# Patient Record
Sex: MALE | Race: WHITE | NOT HISPANIC OR LATINO | Employment: OTHER | URBAN - METROPOLITAN AREA
[De-identification: names, ages, dates, MRNs, and addresses within clinical notes are randomized per-mention and may not be internally consistent; named-entity substitution may affect disease eponyms.]

---

## 2019-03-25 ENCOUNTER — HOSPITAL ENCOUNTER (OUTPATIENT)
Dept: OTHER | Facility: HOSPITAL | Age: 79
Discharge: HOME | End: 2019-03-25

## 2019-03-25 LAB
ALBUMIN SERPL-MCNC: 3.7 G/DL (ref 3.5–5)
ALBUMIN/GLOB SERPL: 1.2 {RATIO} (ref 1.4–2.6)
ALP SERPL-CCNC: 66 U/L (ref 56–155)
ALT SERPL-CCNC: 13 U/L (ref 10–40)
ANION GAP SERPL CALC-SCNC: 18 MMOL/L (ref 8–19)
AST SERPL-CCNC: 15 U/L (ref 15–50)
BASOPHILS # BLD AUTO: 0.08 10*3/UL (ref 0–0.2)
BASOPHILS NFR BLD AUTO: 1.1 % (ref 0–3)
BILIRUB SERPL-MCNC: 0.4 MG/DL (ref 0.2–1.3)
BNP SERPL-MCNC: 194 PG/ML (ref 0–1800)
BUN SERPL-MCNC: 26 MG/DL (ref 5–25)
BUN/CREAT SERPL: 17 {RATIO} (ref 6–20)
CALCIUM SERPL-MCNC: 9.4 MG/DL (ref 8.7–10.4)
CHLORIDE SERPL-SCNC: 102 MMOL/L (ref 99–111)
CHOLEST SERPL-MCNC: 116 MG/DL (ref 107–200)
CHOLEST/HDLC SERPL: 4.3 {RATIO} (ref 3–6)
CONV ABS IMM GRAN: 0.07 10*3/UL (ref 0–0.2)
CONV CO2: 22 MMOL/L (ref 22–32)
CONV IMMATURE GRAN: 1 % (ref 0–1.8)
CONV TOTAL PROTEIN: 6.8 G/DL (ref 6.3–8.2)
CREAT UR-MCNC: 1.53 MG/DL (ref 0.7–1.2)
DEPRECATED RDW RBC AUTO: 42.6 FL (ref 35.1–43.9)
EOSINOPHIL # BLD AUTO: 0.23 10*3/UL (ref 0–0.7)
EOSINOPHIL # BLD AUTO: 3.1 % (ref 0–7)
ERYTHROCYTE [DISTWIDTH] IN BLOOD BY AUTOMATED COUNT: 13 % (ref 11.6–14.4)
GFR SERPLBLD BASED ON 1.73 SQ M-ARVRAT: 42 ML/MIN/{1.73_M2}
GLOBULIN UR ELPH-MCNC: 3.1 G/DL (ref 2–3.5)
GLUCOSE SERPL-MCNC: 125 MG/DL (ref 70–99)
HBA1C MFR BLD: 15.8 G/DL (ref 14–18)
HCT VFR BLD AUTO: 48.3 % (ref 42–52)
HDLC SERPL-MCNC: 27 MG/DL (ref 40–60)
LDLC SERPL CALC-MCNC: 65 MG/DL (ref 70–100)
LYMPHOCYTES # BLD AUTO: 2.31 10*3/UL (ref 1–5)
MCH RBC QN AUTO: 29.6 PG (ref 27–31)
MCHC RBC AUTO-ENTMCNC: 32.7 G/DL (ref 33–37)
MCV RBC AUTO: 90.4 FL (ref 80–96)
MONOCYTES # BLD AUTO: 0.75 10*3/UL (ref 0.2–1.2)
MONOCYTES NFR BLD AUTO: 10.2 % (ref 3–10)
NEUTROPHILS # BLD AUTO: 3.9 10*3/UL (ref 2–8)
NEUTROPHILS NFR BLD AUTO: 53.1 % (ref 30–85)
NRBC CBCN: 0 % (ref 0–0.7)
OSMOLALITY SERPL CALC.SUM OF ELEC: 292 MOSM/KG (ref 273–304)
PLATELET # BLD AUTO: 242 10*3/UL (ref 130–400)
PMV BLD AUTO: 10.5 FL (ref 9.4–12.4)
POTASSIUM SERPL-SCNC: 4.4 MMOL/L (ref 3.5–5.3)
RBC # BLD AUTO: 5.34 10*6/UL (ref 4.7–6.1)
SODIUM SERPL-SCNC: 138 MMOL/L (ref 135–147)
TRIGL SERPL-MCNC: 119 MG/DL (ref 40–150)
VARIANT LYMPHS NFR BLD MANUAL: 31.5 % (ref 20–45)
VLDLC SERPL-MCNC: 24 MG/DL (ref 5–37)
WBC # BLD AUTO: 7.34 10*3/UL (ref 4.8–10.8)

## 2019-05-12 ENCOUNTER — HOSPITAL ENCOUNTER (OUTPATIENT)
Dept: OTHER | Facility: HOSPITAL | Age: 79
Discharge: HOME | End: 2019-05-12

## 2019-05-18 ENCOUNTER — HOSPITAL ENCOUNTER (OUTPATIENT)
Dept: OTHER | Facility: HOSPITAL | Age: 79
Discharge: HOME | End: 2019-05-18

## 2019-05-18 LAB
25(OH)D3 SERPL-MCNC: 34.6 NG/ML (ref 30–100)
ALBUMIN SERPL-MCNC: 3.7 G/DL (ref 3.5–5)
ALBUMIN/GLOB SERPL: 1.5 {RATIO} (ref 1.4–2.6)
ALP SERPL-CCNC: 70 U/L (ref 56–155)
ALT SERPL-CCNC: 28 U/L (ref 10–40)
ANION GAP SERPL CALC-SCNC: 17 MMOL/L (ref 8–19)
AST SERPL-CCNC: 22 U/L (ref 15–50)
BILIRUB SERPL-MCNC: 0.41 MG/DL (ref 0.2–1.3)
BNP SERPL-MCNC: 226 PG/ML (ref 0–1800)
BUN SERPL-MCNC: 24 MG/DL (ref 5–25)
BUN/CREAT SERPL: 16 {RATIO} (ref 6–20)
CALCIUM SERPL-MCNC: 8.7 MG/DL (ref 8.7–10.4)
CHLORIDE SERPL-SCNC: 108 MMOL/L (ref 99–111)
CONV CO2: 22 MMOL/L (ref 22–32)
CONV TOTAL PROTEIN: 6.2 G/DL (ref 6.3–8.2)
CREAT UR-MCNC: 1.53 MG/DL (ref 0.7–1.2)
GFR SERPLBLD BASED ON 1.73 SQ M-ARVRAT: 42 ML/MIN/{1.73_M2}
GLOBULIN UR ELPH-MCNC: 2.5 G/DL (ref 2–3.5)
GLUCOSE SERPL-MCNC: 113 MG/DL (ref 70–99)
OSMOLALITY SERPL CALC.SUM OF ELEC: 301 MOSM/KG (ref 273–304)
POTASSIUM SERPL-SCNC: 4.3 MMOL/L (ref 3.5–5.3)
PSA SERPL-MCNC: 3.67 NG/ML (ref 0–4)
SODIUM SERPL-SCNC: 143 MMOL/L (ref 135–147)
URATE SERPL-MCNC: 7.9 MG/DL (ref 3.5–8.5)
VIT B12 SERPL-MCNC: 387 PG/ML (ref 211–911)

## 2020-01-01 ENCOUNTER — OFFICE VISIT CONVERTED (OUTPATIENT)
Dept: ORTHOPEDIC SURGERY | Facility: CLINIC | Age: 80
End: 2020-01-01
Attending: PHYSICIAN ASSISTANT

## 2020-01-01 ENCOUNTER — HOSPITAL ENCOUNTER (OUTPATIENT)
Dept: SURGERY | Facility: CLINIC | Age: 80
Discharge: HOME | End: 2020-08-13
Attending: NURSE PRACTITIONER

## 2020-01-01 ENCOUNTER — CONVERSION ENCOUNTER (OUTPATIENT)
Dept: SURGERY | Facility: CLINIC | Age: 80
End: 2020-01-01

## 2020-01-01 ENCOUNTER — OFFICE VISIT CONVERTED (OUTPATIENT)
Dept: UROLOGY | Facility: CLINIC | Age: 80
End: 2020-01-01
Attending: UROLOGY

## 2020-01-01 ENCOUNTER — OFFICE VISIT CONVERTED (OUTPATIENT)
Dept: UROLOGY | Facility: CLINIC | Age: 80
End: 2020-01-01
Attending: NURSE PRACTITIONER

## 2020-01-01 ENCOUNTER — CONVERSION ENCOUNTER (OUTPATIENT)
Dept: ORTHOPEDIC SURGERY | Facility: CLINIC | Age: 80
End: 2020-01-01

## 2020-01-01 LAB
AMPICILLIN SUSC ISLT: >=32
AMPICILLIN+SULBAC SUSC ISLT: >=32
BACTERIA UR CULT: ABNORMAL
CEFAZOLIN SUSC ISLT: >=64
CEFEPIME SUSC ISLT: 16
CEFTAZIDIME SUSC ISLT: >=64
CEFTRIAXONE SUSC ISLT: >=64
CIPROFLOXACIN SUSC ISLT: >=4
GENTAMICIN SUSC ISLT: >=16
LEVOFLOXACIN SUSC ISLT: >=8
NITROFURANTOIN SUSC ISLT: <=16
PIP+TAZO SUSC ISLT: 64
TMP SMX SUSC ISLT: <=20
TOBRAMYCIN SUSC ISLT: 8

## 2020-10-31 ENCOUNTER — LAB REQUISITION (OUTPATIENT)
Dept: LAB | Facility: HOSPITAL | Age: 80
End: 2020-10-31

## 2020-10-31 DIAGNOSIS — Z00.00 ROUTINE GENERAL MEDICAL EXAMINATION AT A HEALTH CARE FACILITY: ICD-10-CM

## 2020-10-31 LAB — CARBAMAZEPINE SERPL-MCNC: 3.8 MCG/ML (ref 4–12)

## 2020-10-31 PROCEDURE — 80156 ASSAY CARBAMAZEPINE TOTAL: CPT

## 2021-01-01 ENCOUNTER — APPOINTMENT (OUTPATIENT)
Dept: GENERAL RADIOLOGY | Facility: HOSPITAL | Age: 81
End: 2021-01-01

## 2021-01-01 ENCOUNTER — APPOINTMENT (OUTPATIENT)
Dept: CT IMAGING | Facility: HOSPITAL | Age: 81
End: 2021-01-01

## 2021-01-01 ENCOUNTER — HOSPITAL ENCOUNTER (INPATIENT)
Facility: HOSPITAL | Age: 81
LOS: 6 days | End: 2021-02-15
Attending: EMERGENCY MEDICINE | Admitting: INTERNAL MEDICINE

## 2021-01-01 VITALS
HEIGHT: 72 IN | SYSTOLIC BLOOD PRESSURE: 80 MMHG | TEMPERATURE: 102.6 F | DIASTOLIC BLOOD PRESSURE: 52 MMHG | BODY MASS INDEX: 22.62 KG/M2 | OXYGEN SATURATION: 88 % | WEIGHT: 167 LBS

## 2021-01-01 DIAGNOSIS — E86.0 DEHYDRATION: ICD-10-CM

## 2021-01-01 DIAGNOSIS — J18.9 PNEUMONIA OF RIGHT MIDDLE LOBE DUE TO INFECTIOUS ORGANISM: Primary | ICD-10-CM

## 2021-01-01 DIAGNOSIS — R93.89 ABNORMAL CHEST X-RAY: ICD-10-CM

## 2021-01-01 DIAGNOSIS — R62.7 FAILURE TO THRIVE IN ADULT: ICD-10-CM

## 2021-01-01 LAB
ALBUMIN SERPL-MCNC: 2.8 G/DL (ref 3.5–5.2)
ALBUMIN SERPL-MCNC: 3.2 G/DL (ref 3.5–5.2)
ALBUMIN SERPL-MCNC: 3.4 G/DL (ref 3.5–5.2)
ALBUMIN/GLOB SERPL: 1 G/DL
ALBUMIN/GLOB SERPL: 1.1 G/DL
ALBUMIN/GLOB SERPL: 1.2 G/DL
ALP SERPL-CCNC: 127 U/L (ref 39–117)
ALP SERPL-CCNC: 129 U/L (ref 39–117)
ALP SERPL-CCNC: 135 U/L (ref 39–117)
ALT SERPL W P-5'-P-CCNC: 150 U/L (ref 1–41)
ALT SERPL W P-5'-P-CCNC: 215 U/L (ref 1–41)
ALT SERPL W P-5'-P-CCNC: 373 U/L (ref 1–41)
ANION GAP SERPL CALCULATED.3IONS-SCNC: 10 MMOL/L (ref 5–15)
ANION GAP SERPL CALCULATED.3IONS-SCNC: 13.8 MMOL/L (ref 5–15)
ANION GAP SERPL CALCULATED.3IONS-SCNC: 14.3 MMOL/L (ref 5–15)
ANION GAP SERPL CALCULATED.3IONS-SCNC: 9.8 MMOL/L (ref 5–15)
ARTERIAL PATENCY WRIST A: POSITIVE
AST SERPL-CCNC: 156 U/L (ref 1–40)
AST SERPL-CCNC: 390 U/L (ref 1–40)
AST SERPL-CCNC: 90 U/L (ref 1–40)
ATMOSPHERIC PRESS: 755.3 MMHG
B PARAPERT DNA SPEC QL NAA+PROBE: NOT DETECTED
B PERT DNA SPEC QL NAA+PROBE: NOT DETECTED
BACTERIA SPEC AEROBE CULT: NORMAL
BACTERIA SPEC AEROBE CULT: NORMAL
BASE EXCESS BLDA CALC-SCNC: -2.9 MMOL/L (ref 0–2)
BASOPHILS # BLD AUTO: 0.02 10*3/MM3 (ref 0–0.2)
BASOPHILS # BLD AUTO: 0.02 10*3/MM3 (ref 0–0.2)
BASOPHILS NFR BLD AUTO: 0.7 % (ref 0–1.5)
BASOPHILS NFR BLD AUTO: 0.7 % (ref 0–1.5)
BDY SITE: ABNORMAL
BILIRUB SERPL-MCNC: 0.6 MG/DL (ref 0–1.2)
BILIRUB SERPL-MCNC: 0.6 MG/DL (ref 0–1.2)
BILIRUB SERPL-MCNC: 0.7 MG/DL (ref 0–1.2)
BUN SERPL-MCNC: 30 MG/DL (ref 8–23)
BUN SERPL-MCNC: 44 MG/DL (ref 8–23)
BUN SERPL-MCNC: 63 MG/DL (ref 8–23)
BUN SERPL-MCNC: 74 MG/DL (ref 8–23)
BUN/CREAT SERPL: 27.3 (ref 7–25)
BUN/CREAT SERPL: 36.4 (ref 7–25)
BUN/CREAT SERPL: 39.6 (ref 7–25)
BUN/CREAT SERPL: 41.1 (ref 7–25)
C PNEUM DNA NPH QL NAA+NON-PROBE: NOT DETECTED
CALCIUM SPEC-SCNC: 8.3 MG/DL (ref 8.6–10.5)
CALCIUM SPEC-SCNC: 8.6 MG/DL (ref 8.6–10.5)
CALCIUM SPEC-SCNC: 8.7 MG/DL (ref 8.6–10.5)
CALCIUM SPEC-SCNC: 9.1 MG/DL (ref 8.6–10.5)
CHLORIDE SERPL-SCNC: 111 MMOL/L (ref 98–107)
CHLORIDE SERPL-SCNC: 116 MMOL/L (ref 98–107)
CHLORIDE SERPL-SCNC: 117 MMOL/L (ref 98–107)
CHLORIDE SERPL-SCNC: 120 MMOL/L (ref 98–107)
CK SERPL-CCNC: 253 U/L (ref 20–200)
CK SERPL-CCNC: 275 U/L (ref 20–200)
CO2 SERPL-SCNC: 21.2 MMOL/L (ref 22–29)
CO2 SERPL-SCNC: 21.7 MMOL/L (ref 22–29)
CO2 SERPL-SCNC: 22.2 MMOL/L (ref 22–29)
CO2 SERPL-SCNC: 23 MMOL/L (ref 22–29)
CREAT SERPL-MCNC: 1.1 MG/DL (ref 0.76–1.27)
CREAT SERPL-MCNC: 1.21 MG/DL (ref 0.76–1.27)
CREAT SERPL-MCNC: 1.59 MG/DL (ref 0.76–1.27)
CREAT SERPL-MCNC: 1.8 MG/DL (ref 0.76–1.27)
CRP SERPL-MCNC: 0.93 MG/DL (ref 0–0.5)
CRP SERPL-MCNC: 2.07 MG/DL (ref 0–0.5)
D DIMER PPP FEU-MCNC: 1.8 MCGFEU/ML (ref 0–0.49)
D DIMER PPP FEU-MCNC: 2.17 MCGFEU/ML (ref 0–0.49)
D-LACTATE SERPL-SCNC: 1.4 MMOL/L (ref 0.5–2)
DEPRECATED RDW RBC AUTO: 40.2 FL (ref 37–54)
DEPRECATED RDW RBC AUTO: 42 FL (ref 37–54)
DEPRECATED RDW RBC AUTO: 45.4 FL (ref 37–54)
EOSINOPHIL # BLD AUTO: 0.02 10*3/MM3 (ref 0–0.4)
EOSINOPHIL # BLD AUTO: 0.03 10*3/MM3 (ref 0–0.4)
EOSINOPHIL NFR BLD AUTO: 0.7 % (ref 0.3–6.2)
EOSINOPHIL NFR BLD AUTO: 1 % (ref 0.3–6.2)
ERYTHROCYTE [DISTWIDTH] IN BLOOD BY AUTOMATED COUNT: 12.4 % (ref 12.3–15.4)
ERYTHROCYTE [DISTWIDTH] IN BLOOD BY AUTOMATED COUNT: 12.7 % (ref 12.3–15.4)
ERYTHROCYTE [DISTWIDTH] IN BLOOD BY AUTOMATED COUNT: 13.3 % (ref 12.3–15.4)
FERRITIN SERPL-MCNC: 565 NG/ML (ref 30–400)
FERRITIN SERPL-MCNC: 692 NG/ML (ref 30–400)
FERRITIN SERPL-MCNC: 932 NG/ML (ref 30–400)
FIBRINOGEN PPP-MCNC: 578 MG/DL (ref 219–464)
FLUAV SUBTYP SPEC NAA+PROBE: NOT DETECTED
FLUBV RNA ISLT QL NAA+PROBE: NOT DETECTED
GAS FLOW AIRWAY: 2 LPM
GFR SERPL CREATININE-BSD FRML MDRD: 36 ML/MIN/1.73
GFR SERPL CREATININE-BSD FRML MDRD: 42 ML/MIN/1.73
GFR SERPL CREATININE-BSD FRML MDRD: 57 ML/MIN/1.73
GFR SERPL CREATININE-BSD FRML MDRD: 64 ML/MIN/1.73
GLOBULIN UR ELPH-MCNC: 2.8 GM/DL
GLOBULIN UR ELPH-MCNC: 2.9 GM/DL
GLOBULIN UR ELPH-MCNC: 2.9 GM/DL
GLUCOSE SERPL-MCNC: 112 MG/DL (ref 65–99)
GLUCOSE SERPL-MCNC: 82 MG/DL (ref 65–99)
GLUCOSE SERPL-MCNC: 89 MG/DL (ref 65–99)
GLUCOSE SERPL-MCNC: 99 MG/DL (ref 65–99)
HADV DNA SPEC NAA+PROBE: NOT DETECTED
HAV IGM SERPL QL IA: NORMAL
HBV CORE IGM SERPL QL IA: NORMAL
HBV SURFACE AG SERPL QL IA: NORMAL
HCO3 BLDA-SCNC: 19.3 MMOL/L (ref 22–28)
HCOV 229E RNA SPEC QL NAA+PROBE: NOT DETECTED
HCOV HKU1 RNA SPEC QL NAA+PROBE: NOT DETECTED
HCOV NL63 RNA SPEC QL NAA+PROBE: NOT DETECTED
HCOV OC43 RNA SPEC QL NAA+PROBE: NOT DETECTED
HCT VFR BLD AUTO: 39.9 % (ref 37.5–51)
HCT VFR BLD AUTO: 42.5 % (ref 37.5–51)
HCT VFR BLD AUTO: 46.3 % (ref 37.5–51)
HCV AB SER DONR QL: NORMAL
HGB BLD-MCNC: 13.7 G/DL (ref 13–17.7)
HGB BLD-MCNC: 14.5 G/DL (ref 13–17.7)
HGB BLD-MCNC: 15.1 G/DL (ref 13–17.7)
HMPV RNA NPH QL NAA+NON-PROBE: NOT DETECTED
HOLD SPECIMEN: NORMAL
HOLD SPECIMEN: NORMAL
HPIV1 RNA SPEC QL NAA+PROBE: NOT DETECTED
HPIV2 RNA SPEC QL NAA+PROBE: NOT DETECTED
HPIV3 RNA NPH QL NAA+PROBE: NOT DETECTED
HPIV4 P GENE NPH QL NAA+PROBE: NOT DETECTED
IMM GRANULOCYTES # BLD AUTO: 0.07 10*3/MM3 (ref 0–0.05)
IMM GRANULOCYTES # BLD AUTO: 0.11 10*3/MM3 (ref 0–0.05)
IMM GRANULOCYTES NFR BLD AUTO: 2.4 % (ref 0–0.5)
IMM GRANULOCYTES NFR BLD AUTO: 4 % (ref 0–0.5)
LDH SERPL-CCNC: 310 U/L (ref 135–225)
LDH SERPL-CCNC: 330 U/L (ref 135–225)
LIPASE SERPL-CCNC: 63 U/L (ref 13–60)
LYMPHOCYTES # BLD AUTO: 0.91 10*3/MM3 (ref 0.7–3.1)
LYMPHOCYTES # BLD AUTO: 1.18 10*3/MM3 (ref 0.7–3.1)
LYMPHOCYTES # BLD MANUAL: 0.22 10*3/MM3 (ref 0.7–3.1)
LYMPHOCYTES NFR BLD AUTO: 32.9 % (ref 19.6–45.3)
LYMPHOCYTES NFR BLD AUTO: 40.1 % (ref 19.6–45.3)
LYMPHOCYTES NFR BLD MANUAL: 5.4 % (ref 19.6–45.3)
LYMPHOCYTES NFR BLD MANUAL: 5.4 % (ref 5–12)
M PNEUMO IGG SER IA-ACNC: NOT DETECTED
MCH RBC QN AUTO: 30.7 PG (ref 26.6–33)
MCH RBC QN AUTO: 30.7 PG (ref 26.6–33)
MCH RBC QN AUTO: 31.1 PG (ref 26.6–33)
MCHC RBC AUTO-ENTMCNC: 32.6 G/DL (ref 31.5–35.7)
MCHC RBC AUTO-ENTMCNC: 34.1 G/DL (ref 31.5–35.7)
MCHC RBC AUTO-ENTMCNC: 34.3 G/DL (ref 31.5–35.7)
MCV RBC AUTO: 89.5 FL (ref 79–97)
MCV RBC AUTO: 91.2 FL (ref 79–97)
MCV RBC AUTO: 94.1 FL (ref 79–97)
MODALITY: ABNORMAL
MONOCYTES # BLD AUTO: 0.22 10*3/MM3 (ref 0.1–0.9)
MONOCYTES # BLD AUTO: 0.35 10*3/MM3 (ref 0.1–0.9)
MONOCYTES # BLD AUTO: 0.43 10*3/MM3 (ref 0.1–0.9)
MONOCYTES NFR BLD AUTO: 12.6 % (ref 5–12)
MONOCYTES NFR BLD AUTO: 14.6 % (ref 5–12)
NEUTROPHILS # BLD AUTO: 3.65 10*3/MM3 (ref 1.7–7)
NEUTROPHILS NFR BLD AUTO: 1.21 10*3/MM3 (ref 1.7–7)
NEUTROPHILS NFR BLD AUTO: 1.36 10*3/MM3 (ref 1.7–7)
NEUTROPHILS NFR BLD AUTO: 41.2 % (ref 42.7–76)
NEUTROPHILS NFR BLD AUTO: 49.1 % (ref 42.7–76)
NEUTROPHILS NFR BLD MANUAL: 89.2 % (ref 42.7–76)
NRBC BLD AUTO-RTO: 0 /100 WBC (ref 0–0.2)
NRBC BLD AUTO-RTO: 0 /100 WBC (ref 0–0.2)
PCO2 BLDA: 28 MM HG (ref 35–45)
PH BLDA: 7.45 PH UNITS (ref 7.35–7.45)
PLAT MORPH BLD: NORMAL
PLATELET # BLD AUTO: 154 10*3/MM3 (ref 140–450)
PLATELET # BLD AUTO: 155 10*3/MM3 (ref 140–450)
PLATELET # BLD AUTO: 156 10*3/MM3 (ref 140–450)
PMV BLD AUTO: 11 FL (ref 6–12)
PMV BLD AUTO: 11.2 FL (ref 6–12)
PMV BLD AUTO: 11.5 FL (ref 6–12)
PO2 BLDA: 72.5 MM HG (ref 80–100)
POTASSIUM SERPL-SCNC: 3.8 MMOL/L (ref 3.5–5.2)
POTASSIUM SERPL-SCNC: 3.9 MMOL/L (ref 3.5–5.2)
POTASSIUM SERPL-SCNC: 4.3 MMOL/L (ref 3.5–5.2)
POTASSIUM SERPL-SCNC: 4.3 MMOL/L (ref 3.5–5.2)
PROCALCITONIN SERPL-MCNC: 0.27 NG/ML (ref 0–0.25)
PROCALCITONIN SERPL-MCNC: 0.35 NG/ML (ref 0–0.25)
PROT SERPL-MCNC: 5.6 G/DL (ref 6–8.5)
PROT SERPL-MCNC: 6.1 G/DL (ref 6–8.5)
PROT SERPL-MCNC: 6.3 G/DL (ref 6–8.5)
QT INTERVAL: 386 MS
QT INTERVAL: 392 MS
RBC # BLD AUTO: 4.46 10*6/MM3 (ref 4.14–5.8)
RBC # BLD AUTO: 4.66 10*6/MM3 (ref 4.14–5.8)
RBC # BLD AUTO: 4.92 10*6/MM3 (ref 4.14–5.8)
RBC MORPH BLD: NORMAL
RHINOVIRUS RNA SPEC NAA+PROBE: NOT DETECTED
RSV RNA NPH QL NAA+NON-PROBE: NOT DETECTED
SAO2 % BLDCOA: 95.3 % (ref 92–99)
SARS-COV-2 RNA NPH QL NAA+NON-PROBE: DETECTED
SODIUM SERPL-SCNC: 142 MMOL/L (ref 136–145)
SODIUM SERPL-SCNC: 150 MMOL/L (ref 136–145)
SODIUM SERPL-SCNC: 152 MMOL/L (ref 136–145)
SODIUM SERPL-SCNC: 156 MMOL/L (ref 136–145)
TOTAL RATE: 20 BREATHS/MINUTE
TROPONIN T SERPL-MCNC: 0.01 NG/ML (ref 0–0.03)
WBC # BLD AUTO: 2.77 10*3/MM3 (ref 3.4–10.8)
WBC # BLD AUTO: 2.94 10*3/MM3 (ref 3.4–10.8)
WBC # BLD AUTO: 4.09 10*3/MM3 (ref 3.4–10.8)
WBC MORPH BLD: NORMAL
WHOLE BLOOD HOLD SPECIMEN: NORMAL
WHOLE BLOOD HOLD SPECIMEN: NORMAL

## 2021-01-01 PROCEDURE — 71045 X-RAY EXAM CHEST 1 VIEW: CPT

## 2021-01-01 PROCEDURE — 83615 LACTATE (LD) (LDH) ENZYME: CPT | Performed by: NURSE PRACTITIONER

## 2021-01-01 PROCEDURE — 80053 COMPREHEN METABOLIC PANEL: CPT | Performed by: NURSE PRACTITIONER

## 2021-01-01 PROCEDURE — 82550 ASSAY OF CK (CPK): CPT | Performed by: NURSE PRACTITIONER

## 2021-01-01 PROCEDURE — 84484 ASSAY OF TROPONIN QUANT: CPT | Performed by: NURSE PRACTITIONER

## 2021-01-01 PROCEDURE — 25010000002 MORPHINE PER 10 MG: Performed by: HOSPITALIST

## 2021-01-01 PROCEDURE — 25010000002 LORAZEPAM PER 2 MG: Performed by: HOSPITALIST

## 2021-01-01 PROCEDURE — 25010000002 ENOXAPARIN PER 10 MG: Performed by: NURSE PRACTITIONER

## 2021-01-01 PROCEDURE — 82803 BLOOD GASES ANY COMBINATION: CPT

## 2021-01-01 PROCEDURE — 82728 ASSAY OF FERRITIN: CPT | Performed by: NURSE PRACTITIONER

## 2021-01-01 PROCEDURE — 80048 BASIC METABOLIC PNL TOTAL CA: CPT | Performed by: HOSPITALIST

## 2021-01-01 PROCEDURE — 92610 EVALUATE SWALLOWING FUNCTION: CPT

## 2021-01-01 PROCEDURE — 85025 COMPLETE CBC W/AUTO DIFF WBC: CPT | Performed by: NURSE PRACTITIONER

## 2021-01-01 PROCEDURE — 86140 C-REACTIVE PROTEIN: CPT | Performed by: NURSE PRACTITIONER

## 2021-01-01 PROCEDURE — 25010000002 DEXAMETHASONE SODIUM PHOSPHATE 10 MG/ML SOLUTION: Performed by: HOSPITALIST

## 2021-01-01 PROCEDURE — 85379 FIBRIN DEGRADATION QUANT: CPT | Performed by: NURSE PRACTITIONER

## 2021-01-01 PROCEDURE — 93005 ELECTROCARDIOGRAM TRACING: CPT | Performed by: HOSPITALIST

## 2021-01-01 PROCEDURE — 25010000002 CEFTRIAXONE PER 250 MG: Performed by: NURSE PRACTITIONER

## 2021-01-01 PROCEDURE — 92526 ORAL FUNCTION THERAPY: CPT

## 2021-01-01 PROCEDURE — 36600 WITHDRAWAL OF ARTERIAL BLOOD: CPT

## 2021-01-01 PROCEDURE — 84145 PROCALCITONIN (PCT): CPT | Performed by: NURSE PRACTITIONER

## 2021-01-01 PROCEDURE — 85025 COMPLETE CBC W/AUTO DIFF WBC: CPT | Performed by: EMERGENCY MEDICINE

## 2021-01-01 PROCEDURE — 85384 FIBRINOGEN ACTIVITY: CPT | Performed by: NURSE PRACTITIONER

## 2021-01-01 PROCEDURE — 25010000002 CEFTRIAXONE PER 250 MG: Performed by: HOSPITALIST

## 2021-01-01 PROCEDURE — 36415 COLL VENOUS BLD VENIPUNCTURE: CPT | Performed by: NURSE PRACTITIONER

## 2021-01-01 PROCEDURE — 85007 BL SMEAR W/DIFF WBC COUNT: CPT | Performed by: NURSE PRACTITIONER

## 2021-01-01 PROCEDURE — 25010000002 LORAZEPAM PER 2 MG: Performed by: NURSE PRACTITIONER

## 2021-01-01 PROCEDURE — 93005 ELECTROCARDIOGRAM TRACING: CPT | Performed by: EMERGENCY MEDICINE

## 2021-01-01 PROCEDURE — 87040 BLOOD CULTURE FOR BACTERIA: CPT | Performed by: EMERGENCY MEDICINE

## 2021-01-01 PROCEDURE — 70450 CT HEAD/BRAIN W/O DYE: CPT

## 2021-01-01 PROCEDURE — 93010 ELECTROCARDIOGRAM REPORT: CPT | Performed by: INTERNAL MEDICINE

## 2021-01-01 PROCEDURE — 84145 PROCALCITONIN (PCT): CPT | Performed by: EMERGENCY MEDICINE

## 2021-01-01 PROCEDURE — 80053 COMPREHEN METABOLIC PANEL: CPT | Performed by: EMERGENCY MEDICINE

## 2021-01-01 PROCEDURE — 0202U NFCT DS 22 TRGT SARS-COV-2: CPT | Performed by: EMERGENCY MEDICINE

## 2021-01-01 PROCEDURE — 99285 EMERGENCY DEPT VISIT HI MDM: CPT

## 2021-01-01 PROCEDURE — 83605 ASSAY OF LACTIC ACID: CPT | Performed by: EMERGENCY MEDICINE

## 2021-01-01 PROCEDURE — 83690 ASSAY OF LIPASE: CPT | Performed by: EMERGENCY MEDICINE

## 2021-01-01 PROCEDURE — 80074 ACUTE HEPATITIS PANEL: CPT | Performed by: NURSE PRACTITIONER

## 2021-01-01 RX ORDER — SACCHAROMYCES BOULARDII 250 MG
250 CAPSULE ORAL 2 TIMES DAILY
Status: DISCONTINUED | OUTPATIENT
Start: 2021-01-01 | End: 2021-01-01

## 2021-01-01 RX ORDER — MEMANTINE HYDROCHLORIDE 5 MG/1
5 TABLET ORAL 2 TIMES DAILY
COMMUNITY

## 2021-01-01 RX ORDER — DOCUSATE SODIUM 100 MG/1
100 CAPSULE, LIQUID FILLED ORAL 2 TIMES DAILY
COMMUNITY

## 2021-01-01 RX ORDER — MORPHINE SULFATE 20 MG/ML
5 SOLUTION ORAL
Status: DISCONTINUED | OUTPATIENT
Start: 2021-01-01 | End: 2021-01-01 | Stop reason: HOSPADM

## 2021-01-01 RX ORDER — LORAZEPAM 2 MG/ML
1 INJECTION INTRAMUSCULAR ONCE
Status: DISCONTINUED | OUTPATIENT
Start: 2021-01-01 | End: 2021-01-01

## 2021-01-01 RX ORDER — HYDROCODONE BITARTRATE AND ACETAMINOPHEN 5; 325 MG/1; MG/1
1 TABLET ORAL EVERY 4 HOURS PRN
Status: DISCONTINUED | OUTPATIENT
Start: 2021-01-01 | End: 2021-01-01

## 2021-01-01 RX ORDER — SODIUM CHLORIDE 0.9 % (FLUSH) 0.9 %
10 SYRINGE (ML) INJECTION EVERY 12 HOURS SCHEDULED
Status: DISCONTINUED | OUTPATIENT
Start: 2021-01-01 | End: 2021-01-01

## 2021-01-01 RX ORDER — LIDOCAINE HYDROCHLORIDE 20 MG/ML
5 SOLUTION OROPHARYNGEAL EVERY 4 HOURS PRN
Status: DISCONTINUED | OUTPATIENT
Start: 2021-01-01 | End: 2021-01-01 | Stop reason: HOSPADM

## 2021-01-01 RX ORDER — FAMOTIDINE 20 MG/1
20 TABLET, FILM COATED ORAL 2 TIMES DAILY
COMMUNITY

## 2021-01-01 RX ORDER — DIPHENOXYLATE HYDROCHLORIDE AND ATROPINE SULFATE 2.5; .025 MG/1; MG/1
1 TABLET ORAL
Status: DISCONTINUED | OUTPATIENT
Start: 2021-01-01 | End: 2021-01-01 | Stop reason: HOSPADM

## 2021-01-01 RX ORDER — GLYCOPYRROLATE 0.2 MG/ML
0.2 INJECTION INTRAMUSCULAR; INTRAVENOUS
Status: DISCONTINUED | OUTPATIENT
Start: 2021-01-01 | End: 2021-01-01 | Stop reason: HOSPADM

## 2021-01-01 RX ORDER — ACETAMINOPHEN 325 MG/1
650 TABLET ORAL EVERY 6 HOURS PRN
COMMUNITY

## 2021-01-01 RX ORDER — SODIUM CHLORIDE 9 MG/ML
100 INJECTION, SOLUTION INTRAVENOUS CONTINUOUS
Status: DISCONTINUED | OUTPATIENT
Start: 2021-01-01 | End: 2021-01-01

## 2021-01-01 RX ORDER — LORAZEPAM 2 MG/ML
2 INJECTION INTRAMUSCULAR
Status: DISCONTINUED | OUTPATIENT
Start: 2021-01-01 | End: 2021-01-01 | Stop reason: HOSPADM

## 2021-01-01 RX ORDER — KETOROLAC TROMETHAMINE 30 MG/ML
15 INJECTION, SOLUTION INTRAMUSCULAR; INTRAVENOUS EVERY 6 HOURS PRN
Status: DISCONTINUED | OUTPATIENT
Start: 2021-01-01 | End: 2021-01-01 | Stop reason: HOSPADM

## 2021-01-01 RX ORDER — PROMETHAZINE HYDROCHLORIDE 25 MG/1
6.25 TABLET ORAL EVERY 4 HOURS PRN
Status: DISCONTINUED | OUTPATIENT
Start: 2021-01-01 | End: 2021-01-01 | Stop reason: HOSPADM

## 2021-01-01 RX ORDER — ESTRADIOL 0.5 MG/1
0.5 TABLET ORAL DAILY
COMMUNITY

## 2021-01-01 RX ORDER — BUSPIRONE HYDROCHLORIDE 5 MG/1
5 TABLET ORAL DAILY
COMMUNITY

## 2021-01-01 RX ORDER — SODIUM CHLORIDE 0.9 % (FLUSH) 0.9 %
10 SYRINGE (ML) INJECTION AS NEEDED
Status: DISCONTINUED | OUTPATIENT
Start: 2021-01-01 | End: 2021-01-01 | Stop reason: HOSPADM

## 2021-01-01 RX ORDER — ACETAMINOPHEN 650 MG/1
650 SUPPOSITORY RECTAL EVERY 4 HOURS PRN
Status: DISCONTINUED | OUTPATIENT
Start: 2021-01-01 | End: 2021-01-01 | Stop reason: HOSPADM

## 2021-01-01 RX ORDER — ACETAMINOPHEN 160 MG/5ML
650 SOLUTION ORAL EVERY 4 HOURS PRN
Status: DISCONTINUED | OUTPATIENT
Start: 2021-01-01 | End: 2021-01-01

## 2021-01-01 RX ORDER — OLANZAPINE 10 MG/1
2.5 INJECTION, POWDER, LYOPHILIZED, FOR SOLUTION INTRAMUSCULAR EVERY 8 HOURS PRN
Status: DISCONTINUED | OUTPATIENT
Start: 2021-01-01 | End: 2021-01-01

## 2021-01-01 RX ORDER — LANOLIN ALCOHOL/MO/W.PET/CERES
1000 CREAM (GRAM) TOPICAL DAILY
COMMUNITY

## 2021-01-01 RX ORDER — HYDROCODONE BITARTRATE AND ACETAMINOPHEN 5; 325 MG/1; MG/1
1 TABLET ORAL EVERY 4 HOURS PRN
COMMUNITY

## 2021-01-01 RX ORDER — SACCHAROMYCES BOULARDII 250 MG
250 CAPSULE ORAL 2 TIMES DAILY
COMMUNITY

## 2021-01-01 RX ORDER — LORAZEPAM 2 MG/ML
1 CONCENTRATE ORAL
Status: DISCONTINUED | OUTPATIENT
Start: 2021-01-01 | End: 2021-01-01 | Stop reason: HOSPADM

## 2021-01-01 RX ORDER — LORAZEPAM 2 MG/ML
0.5 INJECTION INTRAMUSCULAR
Status: DISCONTINUED | OUTPATIENT
Start: 2021-01-01 | End: 2021-01-01 | Stop reason: HOSPADM

## 2021-01-01 RX ORDER — METOPROLOL SUCCINATE 25 MG/1
25 TABLET, EXTENDED RELEASE ORAL DAILY
COMMUNITY

## 2021-01-01 RX ORDER — PROMETHAZINE HYDROCHLORIDE 6.25 MG/5ML
6.25 SYRUP ORAL EVERY 4 HOURS PRN
Status: DISCONTINUED | OUTPATIENT
Start: 2021-01-01 | End: 2021-01-01 | Stop reason: HOSPADM

## 2021-01-01 RX ORDER — CARBAMAZEPINE 200 MG/1
400 TABLET ORAL 3 TIMES DAILY
Status: DISCONTINUED | OUTPATIENT
Start: 2021-01-01 | End: 2021-01-01

## 2021-01-01 RX ORDER — DEXTROSE AND SODIUM CHLORIDE 5; .45 G/100ML; G/100ML
75 INJECTION, SOLUTION INTRAVENOUS CONTINUOUS
Status: DISCONTINUED | OUTPATIENT
Start: 2021-01-01 | End: 2021-01-01

## 2021-01-01 RX ORDER — BUSPIRONE HYDROCHLORIDE 5 MG/1
5 TABLET ORAL DAILY
Status: DISCONTINUED | OUTPATIENT
Start: 2021-01-01 | End: 2021-01-01

## 2021-01-01 RX ORDER — ASPIRIN 81 MG/1
81 TABLET ORAL DAILY
COMMUNITY

## 2021-01-01 RX ORDER — HALOPERIDOL 2 MG/ML
1 SOLUTION ORAL EVERY 4 HOURS PRN
Status: DISCONTINUED | OUTPATIENT
Start: 2021-01-01 | End: 2021-01-01 | Stop reason: HOSPADM

## 2021-01-01 RX ORDER — ACETAMINOPHEN 325 MG/1
650 TABLET ORAL EVERY 4 HOURS PRN
Status: DISCONTINUED | OUTPATIENT
Start: 2021-01-01 | End: 2021-01-01

## 2021-01-01 RX ORDER — CEFTRIAXONE SODIUM 1 G/50ML
1 INJECTION, SOLUTION INTRAVENOUS ONCE
Status: COMPLETED | OUTPATIENT
Start: 2021-01-01 | End: 2021-01-01

## 2021-01-01 RX ORDER — TAMSULOSIN HYDROCHLORIDE 0.4 MG/1
0.8 CAPSULE ORAL DAILY
Status: DISCONTINUED | OUTPATIENT
Start: 2021-01-01 | End: 2021-01-01

## 2021-01-01 RX ORDER — TRAZODONE HYDROCHLORIDE 50 MG/1
50 TABLET ORAL NIGHTLY
Status: DISCONTINUED | OUTPATIENT
Start: 2021-01-01 | End: 2021-01-01

## 2021-01-01 RX ORDER — DOCUSATE SODIUM 100 MG/1
100 CAPSULE, LIQUID FILLED ORAL 2 TIMES DAILY
Status: DISCONTINUED | OUTPATIENT
Start: 2021-01-01 | End: 2021-01-01

## 2021-01-01 RX ORDER — DEXTROSE MONOHYDRATE 50 MG/ML
100 INJECTION, SOLUTION INTRAVENOUS CONTINUOUS
Status: DISCONTINUED | OUTPATIENT
Start: 2021-01-01 | End: 2021-01-01

## 2021-01-01 RX ORDER — METOPROLOL SUCCINATE 25 MG/1
25 TABLET, EXTENDED RELEASE ORAL DAILY
Status: DISCONTINUED | OUTPATIENT
Start: 2021-01-01 | End: 2021-01-01

## 2021-01-01 RX ORDER — MORPHINE SULFATE 4 MG/ML
4 INJECTION, SOLUTION INTRAMUSCULAR; INTRAVENOUS
Status: DISCONTINUED | OUTPATIENT
Start: 2021-01-01 | End: 2021-01-01 | Stop reason: HOSPADM

## 2021-01-01 RX ORDER — OLANZAPINE 10 MG/1
5 INJECTION, POWDER, LYOPHILIZED, FOR SOLUTION INTRAMUSCULAR ONCE
Status: COMPLETED | OUTPATIENT
Start: 2021-01-01 | End: 2021-01-01

## 2021-01-01 RX ORDER — GLYCOPYRROLATE 0.2 MG/ML
0.4 INJECTION INTRAMUSCULAR; INTRAVENOUS
Status: DISCONTINUED | OUTPATIENT
Start: 2021-01-01 | End: 2021-01-01 | Stop reason: HOSPADM

## 2021-01-01 RX ORDER — TAMSULOSIN HYDROCHLORIDE 0.4 MG/1
2 CAPSULE ORAL DAILY
COMMUNITY

## 2021-01-01 RX ORDER — BISACODYL 5 MG/1
10 TABLET, DELAYED RELEASE ORAL DAILY PRN
COMMUNITY

## 2021-01-01 RX ORDER — HALOPERIDOL 5 MG/ML
1 INJECTION INTRAMUSCULAR EVERY 4 HOURS PRN
Status: DISCONTINUED | OUTPATIENT
Start: 2021-01-01 | End: 2021-01-01 | Stop reason: HOSPADM

## 2021-01-01 RX ORDER — MEMANTINE HYDROCHLORIDE 5 MG/1
5 TABLET ORAL EVERY 12 HOURS SCHEDULED
Status: DISCONTINUED | OUTPATIENT
Start: 2021-01-01 | End: 2021-01-01

## 2021-01-01 RX ORDER — LORAZEPAM 2 MG/ML
2 CONCENTRATE ORAL
Status: DISCONTINUED | OUTPATIENT
Start: 2021-01-01 | End: 2021-01-01 | Stop reason: HOSPADM

## 2021-01-01 RX ORDER — FAMOTIDINE 20 MG/1
20 TABLET, FILM COATED ORAL
Status: DISCONTINUED | OUTPATIENT
Start: 2021-01-01 | End: 2021-01-01 | Stop reason: DRUGHIGH

## 2021-01-01 RX ORDER — ACETAMINOPHEN 325 MG/1
650 TABLET ORAL EVERY 4 HOURS PRN
Status: DISCONTINUED | OUTPATIENT
Start: 2021-01-01 | End: 2021-01-01 | Stop reason: HOSPADM

## 2021-01-01 RX ORDER — FAMOTIDINE 20 MG/1
20 TABLET, FILM COATED ORAL DAILY
Status: DISCONTINUED | OUTPATIENT
Start: 2021-01-01 | End: 2021-01-01

## 2021-01-01 RX ORDER — CARBAMAZEPINE 200 MG/1
400 TABLET ORAL 3 TIMES DAILY
COMMUNITY

## 2021-01-01 RX ORDER — BISACODYL 5 MG/1
10 TABLET, DELAYED RELEASE ORAL DAILY PRN
Status: DISCONTINUED | OUTPATIENT
Start: 2021-01-01 | End: 2021-01-01

## 2021-01-01 RX ORDER — LORAZEPAM 2 MG/ML
1 INJECTION INTRAMUSCULAR
Status: DISCONTINUED | OUTPATIENT
Start: 2021-01-01 | End: 2021-01-01 | Stop reason: HOSPADM

## 2021-01-01 RX ORDER — IPRATROPIUM BROMIDE AND ALBUTEROL SULFATE 2.5; .5 MG/3ML; MG/3ML
3 SOLUTION RESPIRATORY (INHALATION) EVERY 6 HOURS PRN
COMMUNITY

## 2021-01-01 RX ORDER — HYDROMORPHONE HYDROCHLORIDE 1 MG/ML
0.5 INJECTION, SOLUTION INTRAMUSCULAR; INTRAVENOUS; SUBCUTANEOUS
Status: DISCONTINUED | OUTPATIENT
Start: 2021-01-01 | End: 2021-01-01 | Stop reason: HOSPADM

## 2021-01-01 RX ORDER — LORAZEPAM 2 MG/ML
0.5 CONCENTRATE ORAL
Status: DISCONTINUED | OUTPATIENT
Start: 2021-01-01 | End: 2021-01-01 | Stop reason: HOSPADM

## 2021-01-01 RX ORDER — ONDANSETRON 2 MG/ML
4 INJECTION INTRAMUSCULAR; INTRAVENOUS EVERY 6 HOURS PRN
Status: DISCONTINUED | OUTPATIENT
Start: 2021-01-01 | End: 2021-01-01

## 2021-01-01 RX ORDER — MORPHINE SULFATE 20 MG/ML
10 SOLUTION ORAL
Status: DISCONTINUED | OUTPATIENT
Start: 2021-01-01 | End: 2021-01-01 | Stop reason: HOSPADM

## 2021-01-01 RX ORDER — FAMOTIDINE 10 MG/ML
20 INJECTION, SOLUTION INTRAVENOUS EVERY 12 HOURS SCHEDULED
Status: DISCONTINUED | OUTPATIENT
Start: 2021-01-01 | End: 2021-01-01

## 2021-01-01 RX ORDER — CEFTRIAXONE SODIUM 1 G/50ML
1 INJECTION, SOLUTION INTRAVENOUS EVERY 24 HOURS
Status: DISCONTINUED | OUTPATIENT
Start: 2021-01-01 | End: 2021-01-01

## 2021-01-01 RX ORDER — ACETAMINOPHEN 160 MG/5ML
650 SOLUTION ORAL EVERY 4 HOURS PRN
Status: DISCONTINUED | OUTPATIENT
Start: 2021-01-01 | End: 2021-01-01 | Stop reason: HOSPADM

## 2021-01-01 RX ORDER — MINERAL OIL/I-PROP MYR/WATER
1 LOTION (ML) TOPICAL EVERY 8 HOURS PRN
COMMUNITY

## 2021-01-01 RX ORDER — MORPHINE SULFATE 2 MG/ML
2 INJECTION, SOLUTION INTRAMUSCULAR; INTRAVENOUS
Status: DISCONTINUED | OUTPATIENT
Start: 2021-01-01 | End: 2021-01-01 | Stop reason: HOSPADM

## 2021-01-01 RX ORDER — FOLIC ACID 1 MG/1
1 TABLET ORAL DAILY
COMMUNITY

## 2021-01-01 RX ORDER — SCOLOPAMINE TRANSDERMAL SYSTEM 1 MG/1
1 PATCH, EXTENDED RELEASE TRANSDERMAL
Status: DISCONTINUED | OUTPATIENT
Start: 2021-01-01 | End: 2021-01-01 | Stop reason: HOSPADM

## 2021-01-01 RX ORDER — TRAZODONE HYDROCHLORIDE 50 MG/1
50 TABLET ORAL NIGHTLY
COMMUNITY

## 2021-01-01 RX ORDER — SODIUM CHLORIDE 0.9 % (FLUSH) 0.9 %
10 SYRINGE (ML) INJECTION AS NEEDED
Status: DISCONTINUED | OUTPATIENT
Start: 2021-01-01 | End: 2021-01-01

## 2021-01-01 RX ORDER — DEXAMETHASONE SODIUM PHOSPHATE 10 MG/ML
6 INJECTION, SOLUTION INTRAMUSCULAR; INTRAVENOUS DAILY
Status: DISCONTINUED | OUTPATIENT
Start: 2021-01-01 | End: 2021-01-01

## 2021-01-01 RX ORDER — NITROGLYCERIN 0.4 MG/1
0.4 TABLET SUBLINGUAL
Status: DISCONTINUED | OUTPATIENT
Start: 2021-01-01 | End: 2021-01-01

## 2021-01-01 RX ORDER — PROMETHAZINE HYDROCHLORIDE 12.5 MG/1
6.25 SUPPOSITORY RECTAL EVERY 4 HOURS PRN
Status: DISCONTINUED | OUTPATIENT
Start: 2021-01-01 | End: 2021-01-01 | Stop reason: HOSPADM

## 2021-01-01 RX ORDER — ASPIRIN 81 MG/1
81 TABLET ORAL DAILY
Status: DISCONTINUED | OUTPATIENT
Start: 2021-01-01 | End: 2021-01-01

## 2021-01-01 RX ORDER — ACETAMINOPHEN 650 MG/1
650 SUPPOSITORY RECTAL EVERY 4 HOURS PRN
Status: DISCONTINUED | OUTPATIENT
Start: 2021-01-01 | End: 2021-01-01

## 2021-01-01 RX ORDER — HALOPERIDOL 1 MG/1
1 TABLET ORAL EVERY 4 HOURS PRN
Status: DISCONTINUED | OUTPATIENT
Start: 2021-01-01 | End: 2021-01-01 | Stop reason: HOSPADM

## 2021-01-01 RX ORDER — LORAZEPAM 2 MG/ML
1 INJECTION INTRAMUSCULAR ONCE
Status: COMPLETED | OUTPATIENT
Start: 2021-01-01 | End: 2021-01-01

## 2021-01-01 RX ADMIN — MORPHINE SULFATE 4 MG: 4 INJECTION, SOLUTION INTRAMUSCULAR; INTRAVENOUS at 14:03

## 2021-01-01 RX ADMIN — MORPHINE SULFATE 4 MG: 4 INJECTION, SOLUTION INTRAMUSCULAR; INTRAVENOUS at 21:13

## 2021-01-01 RX ADMIN — LORAZEPAM 1 MG: 2 INJECTION INTRAMUSCULAR; INTRAVENOUS at 23:38

## 2021-01-01 RX ADMIN — GLYCOPYRROLATE 0.4 MG: 0.2 INJECTION INTRAMUSCULAR; INTRAVENOUS at 04:47

## 2021-01-01 RX ADMIN — MORPHINE SULFATE 4 MG: 4 INJECTION, SOLUTION INTRAMUSCULAR; INTRAVENOUS at 01:21

## 2021-01-01 RX ADMIN — MORPHINE SULFATE 4 MG: 4 INJECTION, SOLUTION INTRAMUSCULAR; INTRAVENOUS at 01:08

## 2021-01-01 RX ADMIN — MORPHINE SULFATE 4 MG: 4 INJECTION, SOLUTION INTRAMUSCULAR; INTRAVENOUS at 17:15

## 2021-01-01 RX ADMIN — MORPHINE SULFATE 4 MG: 4 INJECTION, SOLUTION INTRAMUSCULAR; INTRAVENOUS at 01:16

## 2021-01-01 RX ADMIN — LORAZEPAM 1 MG: 2 INJECTION INTRAMUSCULAR at 10:01

## 2021-01-01 RX ADMIN — MORPHINE SULFATE 4 MG: 4 INJECTION, SOLUTION INTRAMUSCULAR; INTRAVENOUS at 08:44

## 2021-01-01 RX ADMIN — BUSPIRONE HYDROCHLORIDE 5 MG: 5 TABLET ORAL at 06:41

## 2021-01-01 RX ADMIN — LORAZEPAM 1 MG: 2 INJECTION INTRAMUSCULAR at 01:17

## 2021-01-01 RX ADMIN — GLYCOPYRROLATE 0.4 MG: 0.2 INJECTION INTRAMUSCULAR; INTRAVENOUS at 13:18

## 2021-01-01 RX ADMIN — SODIUM CHLORIDE 1000 ML: 9 INJECTION, SOLUTION INTRAVENOUS at 00:41

## 2021-01-01 RX ADMIN — CEFTRIAXONE SODIUM 1 G: 1 INJECTION, SOLUTION INTRAVENOUS at 00:26

## 2021-01-01 RX ADMIN — CARBAMAZEPINE 400 MG: 200 TABLET ORAL at 21:22

## 2021-01-01 RX ADMIN — GLYCOPYRROLATE 0.4 MG: 0.2 INJECTION INTRAMUSCULAR; INTRAVENOUS at 14:03

## 2021-01-01 RX ADMIN — TAMSULOSIN HYDROCHLORIDE 0.8 MG: 0.4 CAPSULE ORAL at 06:46

## 2021-01-01 RX ADMIN — DEXAMETHASONE SODIUM PHOSPHATE 6 MG: 10 INJECTION, SOLUTION INTRAMUSCULAR; INTRAVENOUS at 08:17

## 2021-01-01 RX ADMIN — DEXTROSE MONOHYDRATE 100 ML/HR: 50 INJECTION, SOLUTION INTRAVENOUS at 00:25

## 2021-01-01 RX ADMIN — DEXTROSE MONOHYDRATE 100 ML/HR: 50 INJECTION, SOLUTION INTRAVENOUS at 06:40

## 2021-01-01 RX ADMIN — GLYCOPYRROLATE 0.4 MG: 0.2 INJECTION INTRAMUSCULAR; INTRAVENOUS at 21:13

## 2021-01-01 RX ADMIN — LORAZEPAM 1 MG: 2 INJECTION INTRAMUSCULAR at 06:16

## 2021-01-01 RX ADMIN — LORAZEPAM 0.5 MG: 2 INJECTION INTRAMUSCULAR; INTRAVENOUS at 08:44

## 2021-01-01 RX ADMIN — ENOXAPARIN SODIUM 40 MG: 40 INJECTION SUBCUTANEOUS at 08:14

## 2021-01-01 RX ADMIN — ASPIRIN 81 MG: 81 TABLET, COATED ORAL at 06:41

## 2021-01-01 RX ADMIN — LORAZEPAM 1 MG: 2 INJECTION INTRAMUSCULAR at 20:26

## 2021-01-01 RX ADMIN — GLYCOPYRROLATE 0.4 MG: 0.2 INJECTION INTRAMUSCULAR; INTRAVENOUS at 01:08

## 2021-01-01 RX ADMIN — MORPHINE SULFATE 4 MG: 4 INJECTION, SOLUTION INTRAMUSCULAR; INTRAVENOUS at 17:07

## 2021-01-01 RX ADMIN — LORAZEPAM 1 MG: 2 INJECTION INTRAMUSCULAR at 21:13

## 2021-01-01 RX ADMIN — FAMOTIDINE 20 MG: 10 INJECTION INTRAVENOUS at 21:22

## 2021-01-01 RX ADMIN — DEXAMETHASONE SODIUM PHOSPHATE 6 MG: 10 INJECTION, SOLUTION INTRAMUSCULAR; INTRAVENOUS at 16:35

## 2021-01-01 RX ADMIN — OLANZAPINE 2.5 MG: 10 INJECTION, POWDER, LYOPHILIZED, FOR SOLUTION INTRAMUSCULAR at 21:21

## 2021-01-01 RX ADMIN — SODIUM CHLORIDE, PRESERVATIVE FREE 10 ML: 5 INJECTION INTRAVENOUS at 09:21

## 2021-01-01 RX ADMIN — MORPHINE SULFATE 4 MG: 4 INJECTION, SOLUTION INTRAMUSCULAR; INTRAVENOUS at 10:14

## 2021-01-01 RX ADMIN — LORAZEPAM 1 MG: 2 INJECTION INTRAMUSCULAR at 01:08

## 2021-01-01 RX ADMIN — LORAZEPAM 0.5 MG: 2 INJECTION INTRAMUSCULAR; INTRAVENOUS at 17:41

## 2021-01-01 RX ADMIN — MORPHINE SULFATE 4 MG: 4 INJECTION, SOLUTION INTRAMUSCULAR; INTRAVENOUS at 17:05

## 2021-01-01 RX ADMIN — MORPHINE SULFATE 4 MG: 4 INJECTION, SOLUTION INTRAMUSCULAR; INTRAVENOUS at 06:16

## 2021-01-01 RX ADMIN — SODIUM CHLORIDE, PRESERVATIVE FREE 10 ML: 5 INJECTION INTRAVENOUS at 08:15

## 2021-01-01 RX ADMIN — SODIUM CHLORIDE 100 ML/HR: 9 INJECTION, SOLUTION INTRAVENOUS at 09:42

## 2021-01-01 RX ADMIN — Medication 250 MG: at 21:23

## 2021-01-01 RX ADMIN — LORAZEPAM 1 MG: 2 INJECTION INTRAMUSCULAR at 17:05

## 2021-01-01 RX ADMIN — OLANZAPINE 5 MG: 10 INJECTION, POWDER, FOR SOLUTION INTRAMUSCULAR at 02:38

## 2021-01-01 RX ADMIN — LORAZEPAM 1 MG: 2 INJECTION INTRAMUSCULAR at 17:07

## 2021-01-01 RX ADMIN — LORAZEPAM 1 MG: 2 INJECTION INTRAMUSCULAR at 13:18

## 2021-01-01 RX ADMIN — GLYCOPYRROLATE 0.4 MG: 0.2 INJECTION INTRAMUSCULAR; INTRAVENOUS at 01:21

## 2021-01-01 RX ADMIN — SODIUM CHLORIDE, PRESERVATIVE FREE 10 ML: 5 INJECTION INTRAVENOUS at 21:23

## 2021-01-01 RX ADMIN — LORAZEPAM 0.5 MG: 2 INJECTION INTRAMUSCULAR; INTRAVENOUS at 01:29

## 2021-01-01 RX ADMIN — GLYCOPYRROLATE 0.4 MG: 0.2 INJECTION INTRAMUSCULAR; INTRAVENOUS at 17:04

## 2021-01-01 RX ADMIN — GLYCOPYRROLATE 0.4 MG: 0.2 INJECTION INTRAMUSCULAR; INTRAVENOUS at 10:01

## 2021-01-01 RX ADMIN — LORAZEPAM 1 MG: 2 INJECTION INTRAMUSCULAR at 04:47

## 2021-01-01 RX ADMIN — GLYCOPYRROLATE 0.4 MG: 0.2 INJECTION INTRAMUSCULAR; INTRAVENOUS at 17:07

## 2021-01-01 RX ADMIN — MEMANTINE HYDROCHLORIDE 5 MG: 5 TABLET, FILM COATED ORAL at 21:23

## 2021-01-01 RX ADMIN — CEFTRIAXONE SODIUM 1 G: 1 INJECTION, SOLUTION INTRAVENOUS at 01:42

## 2021-01-01 RX ADMIN — ENOXAPARIN SODIUM 40 MG: 40 INJECTION SUBCUTANEOUS at 09:42

## 2021-01-01 RX ADMIN — MORPHINE SULFATE 4 MG: 4 INJECTION, SOLUTION INTRAMUSCULAR; INTRAVENOUS at 20:26

## 2021-01-01 RX ADMIN — LORAZEPAM 1 MG: 2 INJECTION INTRAMUSCULAR at 10:15

## 2021-01-01 RX ADMIN — DEXTROSE MONOHYDRATE 100 ML/HR: 50 INJECTION, SOLUTION INTRAVENOUS at 14:09

## 2021-01-01 RX ADMIN — LORAZEPAM 1 MG: 2 INJECTION INTRAMUSCULAR at 05:25

## 2021-01-01 RX ADMIN — MORPHINE SULFATE 2 MG: 2 INJECTION, SOLUTION INTRAMUSCULAR; INTRAVENOUS at 21:25

## 2021-01-01 RX ADMIN — LORAZEPAM 1 MG: 2 INJECTION INTRAMUSCULAR at 01:21

## 2021-01-01 RX ADMIN — SODIUM CHLORIDE 1000 ML: 9 INJECTION, SOLUTION INTRAVENOUS at 22:26

## 2021-01-01 RX ADMIN — GLYCOPYRROLATE 0.4 MG: 0.2 INJECTION INTRAMUSCULAR; INTRAVENOUS at 06:16

## 2021-01-01 RX ADMIN — MORPHINE SULFATE 2 MG: 2 INJECTION, SOLUTION INTRAMUSCULAR; INTRAVENOUS at 01:29

## 2021-01-01 RX ADMIN — LORAZEPAM 1 MG: 2 INJECTION INTRAMUSCULAR at 13:22

## 2021-01-01 RX ADMIN — LORAZEPAM 1 MG: 2 INJECTION INTRAMUSCULAR at 17:15

## 2021-01-01 RX ADMIN — GLYCOPYRROLATE 0.4 MG: 0.2 INJECTION INTRAMUSCULAR; INTRAVENOUS at 05:25

## 2021-01-01 RX ADMIN — GLYCOPYRROLATE 0.4 MG: 0.2 INJECTION INTRAMUSCULAR; INTRAVENOUS at 10:15

## 2021-01-01 RX ADMIN — MORPHINE SULFATE 2 MG: 2 INJECTION, SOLUTION INTRAMUSCULAR; INTRAVENOUS at 17:42

## 2021-01-01 RX ADMIN — LORAZEPAM 1 MG: 2 INJECTION INTRAMUSCULAR at 21:10

## 2021-01-01 RX ADMIN — ENOXAPARIN SODIUM 40 MG: 40 INJECTION SUBCUTANEOUS at 06:41

## 2021-01-01 RX ADMIN — MORPHINE SULFATE 4 MG: 4 INJECTION, SOLUTION INTRAMUSCULAR; INTRAVENOUS at 13:18

## 2021-01-01 RX ADMIN — SCOPALAMINE 1 PATCH: 1 PATCH, EXTENDED RELEASE TRANSDERMAL at 21:16

## 2021-01-01 RX ADMIN — LORAZEPAM 0.5 MG: 2 INJECTION INTRAMUSCULAR; INTRAVENOUS at 21:25

## 2021-01-01 RX ADMIN — DEXTROSE AND SODIUM CHLORIDE 75 ML/HR: 5; 450 INJECTION, SOLUTION INTRAVENOUS at 14:26

## 2021-01-01 RX ADMIN — DEXAMETHASONE SODIUM PHOSPHATE 6 MG: 10 INJECTION, SOLUTION INTRAMUSCULAR; INTRAVENOUS at 06:41

## 2021-01-01 RX ADMIN — SODIUM CHLORIDE 100 ML/HR: 9 INJECTION, SOLUTION INTRAVENOUS at 02:48

## 2021-01-01 RX ADMIN — GLYCOPYRROLATE 0.4 MG: 0.2 INJECTION INTRAMUSCULAR; INTRAVENOUS at 01:17

## 2021-01-01 RX ADMIN — TRAZODONE HYDROCHLORIDE 50 MG: 50 TABLET ORAL at 21:23

## 2021-01-01 RX ADMIN — DEXTROSE MONOHYDRATE 100 ML/HR: 50 INJECTION, SOLUTION INTRAVENOUS at 10:55

## 2021-01-01 RX ADMIN — MORPHINE SULFATE 4 MG: 4 INJECTION, SOLUTION INTRAMUSCULAR; INTRAVENOUS at 05:24

## 2021-01-01 RX ADMIN — MORPHINE SULFATE 4 MG: 4 INJECTION, SOLUTION INTRAMUSCULAR; INTRAVENOUS at 21:10

## 2021-01-01 RX ADMIN — MORPHINE SULFATE 4 MG: 4 INJECTION, SOLUTION INTRAMUSCULAR; INTRAVENOUS at 10:01

## 2021-01-01 RX ADMIN — LORAZEPAM 1 MG: 2 INJECTION INTRAMUSCULAR at 14:03

## 2021-01-01 RX ADMIN — DEXTROSE MONOHYDRATE 100 ML/HR: 50 INJECTION, SOLUTION INTRAVENOUS at 21:23

## 2021-01-01 RX ADMIN — MORPHINE SULFATE 4 MG: 4 INJECTION, SOLUTION INTRAMUSCULAR; INTRAVENOUS at 13:22

## 2021-01-01 RX ADMIN — MORPHINE SULFATE 4 MG: 4 INJECTION, SOLUTION INTRAMUSCULAR; INTRAVENOUS at 04:47

## 2021-02-08 PROBLEM — J18.9 PNEUMONIA OF RIGHT MIDDLE LOBE DUE TO INFECTIOUS ORGANISM: Status: ACTIVE | Noted: 2021-01-01

## 2021-02-08 NOTE — ED TRIAGE NOTES
Sent here from [facility] for not eating and drinking for several days. Want patient evaluated for Renal Failure     Mask placed on patient in triage. Triage staff wore appropriate PPE during interaction with patient.

## 2021-02-08 NOTE — ED PROVIDER NOTES
EMERGENCY DEPARTMENT ENCOUNTER    Room Number:  07/07  Date of encounter:  2/9/2021  PCP: Provider, No Known  Historian: EMS, nursing home   Full history not obtainable due to: AMS     HPI:  Chief Complaint: Decreased po intake     Context: Scott BLACK is a 80 y.o. male who presents to the ED c/o decreased po intake onset today. Staff concerned for pt dehydration as they have been unable to get him to eat or drink. They are worried about renal failure and requested transport to ER for further evaluation.     The pt cannot provide hx. He does tell me his name but does not answer year or location correctly. He does not follow commands. Hx is very limited.       PAST MEDICAL HISTORY    Active Ambulatory Problems     Diagnosis Date Noted   • No Active Ambulatory Problems     Resolved Ambulatory Problems     Diagnosis Date Noted   • No Resolved Ambulatory Problems     Past Medical History:   Diagnosis Date   • Anxiety    • Atrial fibrillation (CMS/HCC)    • BPH (benign prostatic hyperplasia)    • Chronic kidney disease    • COPD (chronic obstructive pulmonary disease) (CMS/HCC)    • Dementia (CMS/HCC)    • Depression    • Dysphagia    • Femur fracture, left (CMS/HCC)    • GERD (gastroesophageal reflux disease)    • Gout    • Hypertension    • Insomnia    • Iron deficiency    • Metabolic encephalopathy    • Osteoarthritis    • Urinary retention          PAST SURGICAL HISTORY  History reviewed. No pertinent surgical history.      FAMILY HISTORY  History reviewed. No pertinent family history.      SOCIAL HISTORY  Social History     Socioeconomic History   • Marital status:      Spouse name: Not on file   • Number of children: Not on file   • Years of education: Not on file   • Highest education level: Not on file   Tobacco Use   • Smoking status: Unknown If Ever Smoked   Substance and Sexual Activity   • Alcohol use: Not Currently   • Drug use: Not Currently   • Sexual activity: Not Currently          ALLERGIES  Patient has no known allergies.        REVIEW OF SYSTEMS  Review of Systems   All systems reviewed and marked as negative except as listed in HPI       PHYSICAL EXAM    I have reviewed the triage vital signs and nursing notes.    ED Triage Vitals [02/08/21 1414]   Temp Heart Rate Resp BP SpO2   98.2 °F (36.8 °C) 72 17 113/51 95 %      Temp src Heart Rate Source Patient Position BP Location FiO2 (%)   Tympanic -- -- -- --       GENERAL: alert well developed, well nourished in no distress, frail and elderly in appearance   HENT: NCAT, neck supple, trachea midline. MM very dry   EYES: no scleral icterus, PERRL, normal conjunctivae  CV: regular rhythm, regular rate, no murmur  RESPIRATORY: unlabored effort, diminished   ABDOMEN: soft, nontender, nondistended, bowel sounds present  MUSCULOSKELETAL: no gross deformity  NEURO: alert,  Moves all extremities. Speaks very quietly but speech is clear. Does not follow commands    SKIN: warm, dry, no rash  PSYCH:  Appropriate mood and affect    Vital signs and nursing notes reviewed.          LAB RESULTS  Recent Results (from the past 24 hour(s))   ECG 12 Lead    Collection Time: 02/08/21  7:10 PM   Result Value Ref Range    QT Interval 386 ms   Comprehensive Metabolic Panel    Collection Time: 02/08/21  8:44 PM    Specimen: Blood   Result Value Ref Range    Glucose 82 65 - 99 mg/dL    BUN 74 (H) 8 - 23 mg/dL    Creatinine 1.80 (H) 0.76 - 1.27 mg/dL    Sodium 152 (H) 136 - 145 mmol/L    Potassium 4.3 3.5 - 5.2 mmol/L    Chloride 116 (H) 98 - 107 mmol/L    CO2 21.7 (L) 22.0 - 29.0 mmol/L    Calcium 9.1 8.6 - 10.5 mg/dL    Total Protein 6.3 6.0 - 8.5 g/dL    Albumin 3.40 (L) 3.50 - 5.20 g/dL    ALT (SGPT) 373 (H) 1 - 41 U/L    AST (SGOT) 390 (H) 1 - 40 U/L    Alkaline Phosphatase 129 (H) 39 - 117 U/L    Total Bilirubin 0.6 0.0 - 1.2 mg/dL    eGFR Non African Amer 36 (L) >60 mL/min/1.73    Globulin 2.9 gm/dL    A/G Ratio 1.2 g/dL    BUN/Creatinine Ratio 41.1 (H)  7.0 - 25.0    Anion Gap 14.3 5.0 - 15.0 mmol/L   Lipase    Collection Time: 02/08/21  8:44 PM    Specimen: Blood   Result Value Ref Range    Lipase 63 (H) 13 - 60 U/L   CBC Auto Differential    Collection Time: 02/08/21  8:44 PM    Specimen: Blood   Result Value Ref Range    WBC 2.94 (L) 3.40 - 10.80 10*3/mm3    RBC 4.92 4.14 - 5.80 10*6/mm3    Hemoglobin 15.1 13.0 - 17.7 g/dL    Hematocrit 46.3 37.5 - 51.0 %    MCV 94.1 79.0 - 97.0 fL    MCH 30.7 26.6 - 33.0 pg    MCHC 32.6 31.5 - 35.7 g/dL    RDW 13.3 12.3 - 15.4 %    RDW-SD 45.4 37.0 - 54.0 fl    MPV 11.0 6.0 - 12.0 fL    Platelets 154 140 - 450 10*3/mm3    Neutrophil % 41.2 (L) 42.7 - 76.0 %    Lymphocyte % 40.1 19.6 - 45.3 %    Monocyte % 14.6 (H) 5.0 - 12.0 %    Eosinophil % 1.0 0.3 - 6.2 %    Basophil % 0.7 0.0 - 1.5 %    Immature Grans % 2.4 (H) 0.0 - 0.5 %    Neutrophils, Absolute 1.21 (L) 1.70 - 7.00 10*3/mm3    Lymphocytes, Absolute 1.18 0.70 - 3.10 10*3/mm3    Monocytes, Absolute 0.43 0.10 - 0.90 10*3/mm3    Eosinophils, Absolute 0.03 0.00 - 0.40 10*3/mm3    Basophils, Absolute 0.02 0.00 - 0.20 10*3/mm3    Immature Grans, Absolute 0.07 (H) 0.00 - 0.05 10*3/mm3    nRBC 0.0 0.0 - 0.2 /100 WBC   Light Blue Top    Collection Time: 02/08/21  8:44 PM   Result Value Ref Range    Extra Tube hold for add-on    Green Top (Gel)    Collection Time: 02/08/21  8:44 PM   Result Value Ref Range    Extra Tube Hold for add-ons.    Lavender Top    Collection Time: 02/08/21  8:44 PM   Result Value Ref Range    Extra Tube hold for add-on    Gold Top - SST    Collection Time: 02/08/21  8:44 PM   Result Value Ref Range    Extra Tube Hold for add-ons.    Troponin    Collection Time: 02/08/21  8:44 PM    Specimen: Blood   Result Value Ref Range    Troponin T 0.010 0.000 - 0.030 ng/mL   Lactic Acid, Plasma    Collection Time: 02/08/21  8:44 PM    Specimen: Blood   Result Value Ref Range    Lactate 1.4 0.5 - 2.0 mmol/L   Procalcitonin    Collection Time: 02/08/21  8:44 PM     Specimen: Blood   Result Value Ref Range    Procalcitonin 0.35 (H) 0.00 - 0.25 ng/mL   Respiratory Panel PCR w/COVID-19(SARS-CoV-2) [facility]-House, NP Swab in UTM/VTM, 3-4 HR TAT - Swab, Nasopharynx    Collection Time: 02/08/21  8:55 PM    Specimen: Nasopharynx; Swab   Result Value Ref Range    ADENOVIRUS, PCR Not Detected Not Detected    Coronavirus 229E Not Detected Not Detected    Coronavirus HKU1 Not Detected Not Detected    Coronavirus NL63 Not Detected Not Detected    Coronavirus OC43 Not Detected Not Detected    COVID19 Detected (C) Not Detected - Ref. Range    Human Metapneumovirus Not Detected Not Detected    Human Rhinovirus/Enterovirus Not Detected Not Detected    Influenza A PCR Not Detected Not Detected    Influenza B PCR Not Detected Not Detected    Parainfluenza Virus 1 Not Detected Not Detected    Parainfluenza Virus 2 Not Detected Not Detected    Parainfluenza Virus 3 Not Detected Not Detected    Parainfluenza Virus 4 Not Detected Not Detected    RSV, PCR Not Detected Not Detected    Bordetella pertussis pcr Not Detected Not Detected    Bordetella parapertussis PCR Not Detected Not Detected    Chlamydophila pneumoniae PCR Not Detected Not Detected    Mycoplasma pneumo by PCR Not Detected Not Detected       Ordered the above labs and independently reviewed the results.        RADIOLOGY  Xr Chest 1 View    Result Date: 2/8/2021  XR CHEST 1 VW-  HISTORY: Male who is 80 years-old,  altered mental status  TECHNIQUE: Frontal view of the chest  COMPARISON: None available  FINDINGS: The heart size is borderline. Infiltrative opacities are apparent at the right midlung, right base, with atelectasis or infiltrate at the left base. Appearance may represent multifocal pneumonia, possibility of underlying lesion is not excluded, follow-up recommended, CT could be considered for further evaluation. No pleural effusion, or pneumothorax. No acute osseous process.      Multifocal pulmonary  opacities may represent pneumonia, possibility of underlying lesions not excluded, follow-up/further evaluation recommended as indicated. Borderline heart size.  This report was finalized on 2/8/2021 7:09 PM by Dr. Jake Hearn M.D.        I ordered the above noted radiological studies. Independently reviewed by me and discussed with radiologist.  See dictation above for official radiology interpretation.      PROCEDURES    Procedures        MEDICATIONS GIVEN IN ER    Medications   sodium chloride 0.9 % flush 10 mL (has no administration in time range)   cefTRIAXone (ROCEPHIN) IVPB 1 g (has no administration in time range)   sodium chloride 0.9 % bolus 1,000 mL (has no administration in time range)   sodium chloride 0.9 % bolus 1,000 mL (0 mL Intravenous Stopped 2/8/21 2239)   LORazepam (ATIVAN) injection 1 mg (1 mg Intramuscular Given 2/8/21 2338)         PROGRESS, DATA ANALYSIS, CONSULTS, AND MEDICAL DECISION MAKING    All labs have been independently reviewed by me.  All radiology studies have been reviewed by me.   EKG's independently reviewed by me.  Discussion below represents my analysis of pertinent findings related to patient's condition, differential diagnosis, treatment plan and final disposition.    DIFFERENTIAL DIAGNOSIS INCLUDE BUT NOT LIMITED TO: Metabolic encephalopathy, ANIVAL, dehydration, anemia, arrhythmia, AMI, USA, viral syndrome, COVID-19, pneumonia,, subarachnoid hemorrhage, subdural hematoma, brain tumor, withdrawal, polypharmacy      ED Course as of Feb 09 0017   Mon Feb 08, 2021 2130 I viewed cxr on pacs. My findings are no cm. RML and RLL chest wall mass vs infiltrate. Pending final radiology interpretation     [JS]   2131 WBC(!): 2.94 [JS]   2330 Concern for pneumonia on xr. Covid pending at this time. Dehydration is apparent with hypernatremia and increased BUN / Cr. The pt will require admission. Consult placed to A.    [JS]   3939 I discussed pt with Dr Holman who is agreeable  to admit the pt to the hospital.     [JS]   2336 Procalcitonin(!): 0.35 [JS]   2336 Lipase(!): 63 [JS]   2336 Lactate: 1.4 [JS]      ED Course User Index  [JS] Nicol Montoya, APRN       AS OF 00:17 EST VITALS:        BP - 114/78  HR - 100  TEMP - 98.2 °F (36.8 °C) (Tympanic)  O2 SATS - 98%      DIAGNOSIS  Final diagnoses:   Pneumonia of right middle lobe due to infectious organism   Abnormal chest x-ray   Dehydration   Failure to thrive in adult         DISPOSITION  Admission     Pt masked in first look. I wore a surgical mask and protective eye wear throughout my encounters with the pt. I performed hand hygiene on entry into the pt room and upon exit.     Dictated utilizing Dragon dictation:  Much of this encounter note is an electronic transcription/translation of spoken language to printed text. The electronic translation of spoken language may permit erroneous, or at times, nonsensical words or phrases to be inadvertently transcribed; Although I have reviewed the note for such errors, some may still exist.     Nicol Montoya, APRN  02/09/21 0018

## 2021-02-08 NOTE — ED NOTES
IV therapy at bedside for access.  Patient was placed in face mask during first look triage.  Patient was wearing a face mask throughout encounter.  I wore personal protective equipment throughout the encounter.  Hand hygiene was performed before and after patient encounter.      Mignon Garzon RN  02/08/21 6204

## 2021-02-08 NOTE — ED NOTES
IV therapy unable to get blood specimens.  Changed to lab draw and called for phleb.       Mignon Garzon RN  02/08/21 2926

## 2021-02-09 PROBLEM — U07.1 COVID-19 VIRUS DETECTED: Status: ACTIVE | Noted: 2021-01-01

## 2021-02-09 PROBLEM — R79.89 ELEVATED LFTS: Status: ACTIVE | Noted: 2021-01-01

## 2021-02-09 PROBLEM — G93.41 METABOLIC ENCEPHALOPATHY: Status: ACTIVE | Noted: 2021-01-01

## 2021-02-09 PROBLEM — K21.9 GERD (GASTROESOPHAGEAL REFLUX DISEASE): Status: ACTIVE | Noted: 2021-01-01

## 2021-02-09 PROBLEM — J12.82 PNEUMONIA DUE TO COVID-19 VIRUS: Status: ACTIVE | Noted: 2021-01-01

## 2021-02-09 PROBLEM — U07.1 PNEUMONIA DUE TO COVID-19 VIRUS: Status: ACTIVE | Noted: 2021-01-01

## 2021-02-09 PROBLEM — F03.90 DEMENTIA (HCC): Status: ACTIVE | Noted: 2021-01-01

## 2021-02-09 PROBLEM — N17.9 AKI (ACUTE KIDNEY INJURY) (HCC): Status: ACTIVE | Noted: 2021-01-01

## 2021-02-09 PROBLEM — I48.91 ATRIAL FIBRILLATION (HCC): Status: ACTIVE | Noted: 2021-01-01

## 2021-02-09 PROBLEM — N40.0 BPH (BENIGN PROSTATIC HYPERPLASIA): Status: ACTIVE | Noted: 2021-01-01

## 2021-02-09 PROBLEM — I10 ESSENTIAL HYPERTENSION: Status: ACTIVE | Noted: 2021-01-01

## 2021-02-09 PROBLEM — E87.0 HYPERNATREMIA: Status: ACTIVE | Noted: 2021-01-01

## 2021-02-09 NOTE — SIGNIFICANT NOTE
02/09/21 1435   OTHER   Discipline physical therapist   Rehab Time/Intention   Session Not Performed other (see comments)  (Pt is a [facility] resident and plans to return at [facility]. He is dependent with mobility at baseline. Acute PT not indicated at this time. Will sign off.)

## 2021-02-09 NOTE — PLAN OF CARE
Goal Outcome Evaluation:  Plan of Care Reviewed With: patient  Progress: no change     Patient is disoriented and would not speak to nurse. Patient would only make noises. Nurse called nursing facility for admission information at 0405 and there was no answer. Patient in no acute distress. Emergency contact also called and he was asleep. Patient placed on bed alarm. Patient is not combative at this time. He continues on 4L NC. Lungs are clear and no cough was observed. Will continue to monitor this shift.

## 2021-02-09 NOTE — THERAPY EVALUATION
Acute Care - Speech Language Pathology   Swallow Initial Evaluation [facility]     Patient Name: Scott BLACK  : 1940  MRN: [id number]  Today's Date: 2021               Admit Date: 2021    Visit Dx:     ICD-10-CM ICD-9-CM   1. Pneumonia of right middle lobe due to infectious organism  J18.9 486   2. Abnormal chest x-ray  R93.89 793.2   3. Dehydration  E86.0 276.51   4. Failure to thrive in adult  R62.7 783.7     Patient Active Problem List   Diagnosis   • Pneumonia of right middle lobe due to infectious organism   • Pneumonia due to COVID-19 virus   • COVID-19 virus detected   • Hypernatremia   • ANIVAL (acute kidney injury) (CMS/HCC)   • Elevated LFTs   • Atrial fibrillation (CMS/HCC)   • Essential hypertension   • Dementia (CMS/HCC)   • BPH (benign prostatic hyperplasia)   • GERD (gastroesophageal reflux disease)   • Metabolic encephalopathy     Past Medical History:   Diagnosis Date   • Anxiety    • Atrial fibrillation (CMS/HCC)    • BPH (benign prostatic hyperplasia)    • Chronic kidney disease    • COPD (chronic obstructive pulmonary disease) (CMS/HCC)    • Dementia (CMS/HCC)    • Depression    • Dysphagia    • Femur fracture, left (CMS/HCC)    • GERD (gastroesophageal reflux disease)    • Gout    • Hypertension    • Insomnia    • Iron deficiency    • Metabolic encephalopathy    • Osteoarthritis    • Urinary retention      History reviewed. No pertinent surgical history.     Patient was not wearing a face mask during this therapy encounter. Therapist used appropriate personal protective equipment including gown, eye protection, mask and gloves.  Mask used was N95/duckbill. Appropriate PPE was worn during the entire therapy session. Hand hygiene was completed before and after therapy session. Patient is in enhanced droplet precautions.          SWALLOW EVALUATION (last 72 hours)      SLP Adult Swallow Evaluation     Row Name 21 1600                   Rehab Evaluation    Document Type   evaluation  -AW        Subjective Information  no complaints  -AW        Patient Observations  alert;cooperative;agree to therapy  -AW        Patient/Family/Caregiver Comments/Observations  Pt upright in bed, unable to follow commands, speech garbled.  -AW        Care Plan Review  evaluation/treatment results reviewed;patient/other agree to care plan  -AW        Patient Effort  adequate  -AW        Symptoms Noted During/After Treatment  none  -AW           General Information    Patient Profile Reviewed  yes  -AW        Pertinent History Of Current Problem  Pt admitted from NH w/ decreased intake, Covid 19 PNA, h/o COPD, GERD, dementia  -AW        Current Method of Nutrition  NPO  -AW        Precautions/Limitations, Vision  WFL;for purposes of eval  -AW        Precautions/Limitations, Hearing  WFL;for purposes of eval  -AW        Prior Level of Function-Communication  cognitive-linguistic impairment  -AW        Prior Level of Function-Swallowing  mechanical soft textures;thin liquids  -AW        Plans/Goals Discussed with  patient  -AW        Barriers to Rehab  cognitive status  -AW        Patient's Goals for Discharge  patient did not state  -AW           Oral Motor Structure and Function    Secretion Management  WNL/WFL  -AW        Mucosal Quality  moist, healthy  -AW        Volitional Swallow  unable to elicit  -AW           Oral Musculature and Cranial Nerve Assessment    Oral Motor General Assessment  unable to assess  -AW           General Eating/Swallowing Observations    Respiratory Support Currently in Use  nasal cannula  -AW        Eating/Swallowing Skills  fed by SLP  -AW        Positioning During Eating  upright in bed  -AW        Utensils Used  spoon;cup  -AW        Consistencies Trialed  ice chips;nectar/syrup-thick liquids;honey-thick liquids;pureed  -AW        Pre SpO2 (%)  98  -AW        Post SpO2 (%)  98  -AW           Clinical Swallow Eval    Oral Transit  impaired  -AW        Pharyngeal Phase   suspected pharyngeal impairment  -AW        Clinical Swallow Evaluation Summary  Clinical swallow eval completed. Swallow was initiated with small ice chips w/ weak cough noted. Pt given trials of NTL (tsp), HTL (tsp, cup), and pureed.  Swallow was significantly delayed, with premature spillage suspected. Pt swallowed 2-3 times with each trial. Laryngeal elevation was adequate but sluggish. After pureed trial, coughing began with 2 harsh sneezes, and then more weak coughing. Suspect pharyngeal residuals and likely penetration/aspiration. Pt is not ready for PO diet. REC NPO w/ meds via alternative; frequent oral care. ST to f/u as pt's labs improve for reeval.   -AW           Clinical Impression    SLP Swallowing Diagnosis  oral dysphagia;suspected pharyngeal dysphagia  -AW        Functional Impact  risk of aspiration/pneumonia  -AW        Rehab Potential/Prognosis, Swallowing  adequate, monitor progress closely  -AW        Swallow Criteria for Skilled Therapeutic Interventions Met  demonstrates skilled criteria  -AW           Recommendations    Therapy Frequency (Swallow)  PRN  -AW        Predicted Duration Therapy Intervention (Days)  until discharge  -AW        SLP Diet Recommendation  NPO  -AW        Oral Care Recommendations  Oral Care BID/PRN  -AW        SLP Rec. for Method of Medication Administration  meds via alternate route  -AW        Monitor for Signs of Aspiration  yes;notify SLP if any concerns  -AW        Anticipated Discharge Disposition (SLP)  extended care facility  -AW           Swallow Goals (SLP)    Oral Nutrition/Hydration Goal Selection (SLP)  oral nutrition/hydration, SLP goal 1  -AW           Oral Nutrition/Hydration Goal 1 (SLP)    Oral Nutrition/Hydration Goal 1, SLP  Pt will safely tolerate the least restrictive PO diet.  -AW        Time Frame (Oral Nutrition/Hydration Goal 1, SLP)  by discharge  -AW          User Key  (r) = Recorded By, (t) = Taken By, (c) = Cosigned By    Initials Name  Effective Dates    Mignon Emmanuel MS CCC-SLP 06/08/18 -           EDUCATION  The patient has been educated in the following areas:   Dysphagia (Swallowing Impairment) Oral Care/Hydration.    SLP Recommendation and Plan  SLP Swallowing Diagnosis: oral dysphagia, suspected pharyngeal dysphagia  SLP Diet Recommendation: NPO     SLP Rec. for Method of Medication Administration: meds via alternate route     Monitor for Signs of Aspiration: yes, notify SLP if any concerns     Swallow Criteria for Skilled Therapeutic Interventions Met: demonstrates skilled criteria  Anticipated Discharge Disposition (SLP): extended care facility  Rehab Potential/Prognosis, Swallowing: adequate, monitor progress closely  Therapy Frequency (Swallow): PRN  Predicted Duration Therapy Intervention (Days): until discharge                         Outcome Summary: Clinical swallow eval completed. Swallow was initiated with small ice chips w/ weak cough noted. Pt given trials of NTL (tsp), HTL (tsp, cup), and pureed.  Swallow was significantly delayed, with premature spillage suspected. Pt swallowed 2-3 times with each trial. Laryngeal elevation was adequate but sluggish. After pureed trial, coughing began with 2 harsh sneezes, and then more weak coughing. Suspect pharyngeal residuals and likely penetration/aspiration. Pt is not ready for PO diet. REC NPO w/ meds via alternative; frequent oral care. ST to f/u as pt's labs improve for reeval.    SLP GOALS     Row Name 02/09/21 1600             Oral Nutrition/Hydration Goal 1 (SLP)    Oral Nutrition/Hydration Goal 1, SLP  Pt will safely tolerate the least restrictive PO diet.  -AW      Time Frame (Oral Nutrition/Hydration Goal 1, SLP)  by discharge  -AW        User Key  (r) = Recorded By, (t) = Taken By, (c) = Cosigned By    Initials Name Provider Type    Mignon Emmanuel MS CCC-SLP Speech and Language Pathologist           SLP Outcome Measures (last 72 hours)      SLP Outcome Measures     Row Name  02/09/21 1600             SLP Outcome Measures    Outcome Measure Used?  Adult NOMS  -AW         Adult FCM Scores    FCM Chosen  Swallowing  -AW      Swallowing FCM Score  1  -AW        User Key  (r) = Recorded By, (t) = Taken By, (c) = Cosigned By    Initials Name Effective Dates    Mignon Emmanuel MS CCC-SLP 06/08/18 -            Time Calculation:   Time Calculation- SLP     Row Name 02/09/21 1645             Time Calculation- SLP    SLP Start Time  1500  -AW      SLP Received On  02/09/21  -AW        User Key  (r) = Recorded By, (t) = Taken By, (c) = Cosigned By    Initials Name Provider Type    Mignon Emmanuel, MS CCC-SLP Speech and Language Pathologist          Therapy Charges for Today     Code Description Service Date Service Provider Modifiers Qty    [id number] HC ST EVAL ORAL PHARYNG SWALLOW 4 2/9/2021 Mignon Neves, MS CCC-SLP GN 1               Mignon Neves MS CCC-SLP  2/9/2021

## 2021-02-09 NOTE — ED NOTES
This RN entered the pt's room due to the patient no longer appearing on nurse's station monitor. Pt had removed all monitors and his IV for a second time. Pt cleaned up and placed on bed alarm at this time.      Juan Lazcano RN  02/08/21 7485

## 2021-02-09 NOTE — ED PROVIDER NOTES
Pt presents to the ED c/o  reportedly not eating or drinking for the past several days.  Patient is aphasic so he cannot give us a history.  [facility] wanted patient to be evaluated for renal failure.     On exam,   Patient's heart is regular rate and rhythm without murmurs.  Patient lungs are diminished and he is not in respiratory distress.  There is no wheezing or rhonchi.  His neck is supple without lymphadenopathy.  His abdomen is normoactive bowel sounds, soft, nontender nondistended.  His extremities which reveal no edema or calf tenderness.     Plan: Patient needs to be evaluated for sepsis, pneumonia, SARS-CoV-2 amongst other issues.  I agree with gentle hydration.    Patient was placed in face mask in first look. Patient was wearing facemask when I entered the room and throughout our encounter. I wore full protective equipment throughout this patient encounter including a N95 face mask, eye shield, gown and gloves. Hand hygiene was performed before donning protective equipment and after removal when leaving the room.       Attestation:  The SLAVA and I have discussed this patient's history, physical exam, and treatment plan.  I have reviewed the documentation and personally had a face to face interaction with the patient. I affirm the documentation and agree with the treatment and plan.  The attached note describes my personal findings.            Terrence Navarrete MD  02/08/21 1923

## 2021-02-09 NOTE — PLAN OF CARE
Goal Outcome Evaluation:        Outcome Summary: Clinical swallow eval completed. Swallow was initiated with small ice chips w/ weak cough noted. Pt given trials of NTL (tsp), HTL (tsp, cup), and pureed.  Swallow was significantly delayed, with premature spillage suspected. Pt swallowed 2-3 times with each trial. Laryngeal elevation was adequate but sluggish. After pureed trial, coughing began with 2 harsh sneezes, and then more weak coughing. Suspect pharyngeal residuals and likely penetration/aspiration. Pt is not ready for PO diet. REC NPO w/ meds via alternative; frequent oral care. ST to f/u as pt's labs improve for reeval.

## 2021-02-09 NOTE — PROGRESS NOTES
"Adult Nutrition  Assessment/PES    Patient Name:  Scott BLACK  YOB: 1940  MRN: [id number]  Admit Date:  2/8/2021    Assessment Date:  2/9/2021    Comments:  Nutrition screen completed for MST 3 per RN for decrease po intake  . Pt with Covid 19 pneumonia. Diet NPO. Speech to evaluate. Will follow.    Reason for Assessment     Row Name 02/09/21 0722          Reason for Assessment    Reason For Assessment  identified at risk by screening criteria     Diagnosis  -- COVID 19 pneumonia, dementia, ARK, AFIB, HTN, GERD     Identified At Risk by Screening Criteria  MST SCORE 2+           Anthropometrics     Row Name 02/09/21 0723          Anthropometrics    Height  182.9 cm (72.01\")     Weight  75.8 kg (167 lb) not weighed by RD        Ideal Body Weight (IBW)    Ideal Body Weight (IBW) (kg)  82.09     % Ideal Body Weight  92.28        Body Mass Index (BMI)    BMI (kg/m2)  22.69     BMI Assessment  BMI 18.5-24.9: normal         Labs/Tests/Procedures/Meds     Row Name 02/09/21 0723          Labs/Procedures/Meds    Lab Results Reviewed  reviewed     Lab Results Comments  na, bun, cr, alt, alb, lipase,        Diagnostic Tests/Procedures    Diagnostic Test/Procedure Reviewed  reviewed     Diagnostic Test/Procedures Comments  speech to see for diet        Medications    Pertinent Medications Reviewed  reviewed     Pertinent Medications Comments  colace, lovenox, pepcid, florastro, IVF's at 100         Physical Findings     Row Name 02/09/21 0724          Physical Findings    Overall Physical Appearance  on oxygen therapy     Skin  -- travis 12         Estimated/Assessed Needs     Row Name 02/09/21 0723          Calculation Measurements    Height  182.9 cm (72.01\")         Nutrition Prescription Ordered     Row Name 02/09/21 0725          Nutrition Prescription PO    Current PO Diet  NPO                 Problem/Interventions:  Problem 1     Row Name 02/09/21 0725          Nutrition Diagnoses Problem 1    Problem 1 "  Predicted Suboptimal Intake     Etiology (related to)  Medical Diagnosis               Intervention Goal     Row Name 02/09/21 0726          Intervention Goal    General  Maintain nutrition;Disease management/therapy;Reduce/improve symptoms     PO  Increase intake;PO intake (%)     PO Intake %  75 %     Weight  Maintain weight         Nutrition Intervention     Row Name 02/09/21 0726          Nutrition Intervention    RD/Tech Action  Follow Tx progress;Care plan reviewd           Education/Evaluation     Row Name 02/09/21 0726          Education    Education  Will Instruct as appropriate        Monitor/Evaluation    Monitor  Per protocol           Electronically signed by:  Gisela Zhou RD  02/09/21 07:28 EST

## 2021-02-09 NOTE — H&P
Patient Name:  Scott BLACK  YOB: 1940  MRN:  [id number]  Admit Date:  2/8/2021  Patient Care Team:  Provider, No Known as PCP - General      Subjective   History Present Illness     Chief Complaint   Patient presents with   • Not Eating       Mr. BLACK is a 80 y.o. non-smoker with a history of COPD, atrial fibrillation, BPH, dementia, hypertension, and GERD that presents to [facility] complaining of decreased PO intake.  He is a poor historian on exam so history is obtained from medical records.  Per ED notes, he was sent from his nursing home because he had decreased PO intake yesterday.  Staff was unable to get the patient to eat or drink and were concerned about dehydration.  Lab work in the ED revealed Na 152, Cl 116, creat 1.80, BUN 74, GFR 36, alkaline phosphatase 129, , , procal 0.35, and WBC 2.94.  Chest x-ray showed multifocal pulmonary opacities indicative of pneumonia.  A respiratory viral panel was positive for COVID 19.           History of Present Illness  Review of Systems   Unable to perform ROS: Dementia        Personal History     Past Medical History:   Diagnosis Date   • Anxiety    • Atrial fibrillation (CMS/HCC)    • BPH (benign prostatic hyperplasia)    • Chronic kidney disease    • COPD (chronic obstructive pulmonary disease) (CMS/HCC)    • Dementia (CMS/HCC)    • Depression    • Dysphagia    • Femur fracture, left (CMS/HCC)    • GERD (gastroesophageal reflux disease)    • Gout    • Hypertension    • Insomnia    • Iron deficiency    • Metabolic encephalopathy    • Osteoarthritis    • Urinary retention      History reviewed. No pertinent surgical history.  History reviewed. No pertinent family history.  Social History     Tobacco Use   • Smoking status: Unknown If Ever Smoked   Substance Use Topics   • Alcohol use: Not Currently   • Drug use: Not Currently     Medications Prior to Admission   Medication Sig Dispense Refill Last Dose   •  acetaminophen (TYLENOL) 325 MG tablet Take 650 mg by mouth Every 6 (Six) Hours As Needed for Mild Pain .      • aspirin 81 MG EC tablet Take 81 mg by mouth Daily.      • bisacodyl (DULCOLAX) 5 MG EC tablet Take 10 mg by mouth Daily As Needed for Constipation.      • busPIRone (BUSPAR) 5 MG tablet Take 5 mg by mouth Daily.      • carBAMazepine (TEGretol) 200 MG tablet Take 400 mg by mouth 3 (Three) Times a Day.      • docusate sodium (COLACE) 100 MG capsule Take 100 mg by mouth 2 (Two) Times a Day.      • Ergocalciferol (VITAMIN D2 PO) Take 50,000 Units by mouth Daily.      • estradiol (ESTRACE) 0.5 MG tablet Take 0.5 mg by mouth Daily.      • famotidine (PEPCID) 20 MG tablet Take 20 mg by mouth 2 (Two) Times a Day.      • folic acid (FOLVITE) 1 MG tablet Take 1 mg by mouth Daily.      • guaiFENesin (ROBITUSSIN) 100 MG/5ML solution oral solution Take 200 mg by mouth Every 4 (Four) Hours As Needed.      • HYDROcodone-acetaminophen (NORCO) 5-325 MG per tablet Take 1 tablet by mouth Every 4 (Four) Hours As Needed.      • ipratropium-albuterol (DUO-NEB) 0.5-2.5 mg/3 ml nebulizer Take 3 mL by nebulization Every 6 (Six) Hours As Needed for Wheezing.      • magnesium hydroxide (MILK OF MAGNESIA) 400 MG/5ML suspension Take  by mouth Daily As Needed for Constipation.      • memantine (NAMENDA) 5 MG tablet Take 5 mg by mouth 2 (Two) Times a Day.      • metoprolol succinate XL (TOPROL-XL) 25 MG 24 hr tablet Take 25 mg by mouth Daily.      • saccharomyces boulardii (FLORASTOR) 250 MG capsule Take 250 mg by mouth 2 (Two) Times a Day.      • Skin Protectants, Misc. (Hydrocerin) lotion Apply 1 application topically Every 8 (Eight) Hours As Needed.      • tamsulosin (FLOMAX) 0.4 MG capsule 24 hr capsule Take 2 capsules by mouth Daily.      • traZODone (DESYREL) 50 MG tablet Take 50 mg by mouth Every Night.      • vitamin B-12 (CYANOCOBALAMIN) 1000 MCG tablet Take 1,000 mcg by mouth Daily.        Allergies:  No Known  Allergies    Objective    Objective     Vital Signs  Temp:  [95.7 °F (35.4 °C)-98.2 °F (36.8 °C)] 95.7 °F (35.4 °C)  Heart Rate:  [] 88  Resp:  [15-22] 16  BP: ()/() 131/87  SpO2:  [88 %-99 %] 98 %  on  Flow (L/min):  [3-4] 3;   Device (Oxygen Therapy): nasal cannula  Body mass index is 22.65 kg/m².    Physical Exam  Vitals signs and nursing note reviewed.   Constitutional:       Appearance: Normal appearance.      Comments: Elderly, frail   HENT:      Head: Normocephalic and atraumatic.      Nose: Nose normal.      Mouth/Throat:      Mouth: Mucous membranes are dry.      Pharynx: Oropharynx is clear.   Eyes:      Extraocular Movements: Extraocular movements intact.      Conjunctiva/sclera: Conjunctivae normal.   Neck:      Musculoskeletal: Normal range of motion and neck supple.   Cardiovascular:      Rate and Rhythm: Normal rate and regular rhythm.      Pulses: Normal pulses.      Heart sounds: Normal heart sounds.   Pulmonary:      Effort: Pulmonary effort is normal.      Breath sounds: Examination of the right-lower field reveals decreased breath sounds. Examination of the left-lower field reveals decreased breath sounds. Decreased breath sounds present.   Abdominal:      General: Bowel sounds are normal. There is no distension.      Palpations: Abdomen is soft.      Tenderness: There is no abdominal tenderness.   Musculoskeletal: Normal range of motion.         General: No swelling.   Skin:     General: Skin is warm and dry.   Neurological:      Mental Status: He is alert.      Comments: Patient sedated due to recent medication administration, unable to assess orientation at this time   Psychiatric:         Mood and Affect: Mood normal.         Behavior: Behavior normal.         Results Review:  I reviewed the patient's new clinical results.  I reviewed the patient's new imaging results and agree with the interpretation.  I reviewed the patient's other test results and agree with the  interpretation  I personally viewed and interpreted the patient's EKG/Telemetry data  Discussed with ED provider.    Lab Results (last 24 hours)     Procedure Component Value Units Date/Time    CBC & Differential [[id number]]  (Abnormal) Collected: 02/08/21 2044    Specimen: Blood Updated: 02/08/21 2057    Narrative:      The following orders were created for panel order CBC & Differential.  Procedure                               Abnormality         Status                     ---------                               -----------         ------                     CBC Auto Differential[[id number]]        Abnormal            Final result                 Please view results for these tests on the individual orders.    Comprehensive Metabolic Panel [[id number]]  (Abnormal) Collected: 02/08/21 2044    Specimen: Blood Updated: 02/08/21 2159     Glucose 82 mg/dL      BUN 74 mg/dL      Creatinine 1.80 mg/dL      Sodium 152 mmol/L      Potassium 4.3 mmol/L      Chloride 116 mmol/L      CO2 21.7 mmol/L      Calcium 9.1 mg/dL      Total Protein 6.3 g/dL      Albumin 3.40 g/dL      ALT (SGPT) 373 U/L      AST (SGOT) 390 U/L      Alkaline Phosphatase 129 U/L      Total Bilirubin 0.6 mg/dL      eGFR Non African Amer 36 mL/min/1.73      Globulin 2.9 gm/dL      A/G Ratio 1.2 g/dL      BUN/Creatinine Ratio 41.1     Anion Gap 14.3 mmol/L     Narrative:      GFR Normal >60  Chronic Kidney Disease <60  Kidney Failure <15      Lipase [[id number]]  (Abnormal) Collected: 02/08/21 2044    Specimen: Blood Updated: 02/08/21 2200     Lipase 63 U/L     CBC Auto Differential [[id number]]  (Abnormal) Collected: 02/08/21 2044    Specimen: Blood Updated: 02/08/21 2057     WBC 2.94 10*3/mm3      RBC 4.92 10*6/mm3      Hemoglobin 15.1 g/dL      Hematocrit 46.3 %      MCV 94.1 fL      MCH 30.7 pg      MCHC 32.6 g/dL      RDW 13.3 %      RDW-SD 45.4 fl      MPV 11.0 fL      Platelets 154 10*3/mm3      Neutrophil % 41.2 %      Lymphocyte % 40.1 %       Monocyte % 14.6 %      Eosinophil % 1.0 %      Basophil % 0.7 %      Immature Grans % 2.4 %      Neutrophils, Absolute 1.21 10*3/mm3      Lymphocytes, Absolute 1.18 10*3/mm3      Monocytes, Absolute 0.43 10*3/mm3      Eosinophils, Absolute 0.03 10*3/mm3      Basophils, Absolute 0.02 10*3/mm3      Immature Grans, Absolute 0.07 10*3/mm3      nRBC 0.0 /100 WBC     Troponin [[id number]]  (Normal) Collected: 02/08/21 2044    Specimen: Blood Updated: 02/08/21 2200     Troponin T 0.010 ng/mL     Narrative:      Troponin T Reference Range:  <= 0.03 ng/mL-   Negative for AMI  >0.03 ng/mL-     Abnormal for myocardial necrosis.  Clinicians would have to utilize clinical acumen, EKG, Troponin and serial changes to determine if it is an Acute Myocardial Infarction or myocardial injury due to an underlying chronic condition.       Results may be falsely decreased if patient taking Biotin.      Blood Culture - Blood, Arm, Left [[id number]] Collected: 02/08/21 2044    Specimen: Blood from Arm, Left Updated: 02/08/21 2044    Blood Culture - Blood, Arm, Left [[id number]] Collected: 02/08/21 2044    Specimen: Blood from Arm, Left Updated: 02/08/21 2044    Lactic Acid, Plasma [[id number]]  (Normal) Collected: 02/08/21 2044    Specimen: Blood Updated: 02/08/21 2114     Lactate 1.4 mmol/L     Procalcitonin [[id number]]  (Abnormal) Collected: 02/08/21 2044    Specimen: Blood Updated: 02/08/21 2203     Procalcitonin 0.35 ng/mL     Narrative:      As a Marker for Sepsis (Non-Neonates):   1. <0.5 ng/mL represents a low risk of severe sepsis and/or septic shock.  1. >2 ng/mL represents a high risk of severe sepsis and/or septic shock.    As a Marker for Lower Respiratory Tract Infections that require antibiotic therapy:  PCT on Admission     Antibiotic Therapy             6-12 Hrs later  > 0.5                Strongly Recommended            >0.25 - <0.5         Recommended  0.1 - 0.25           Discouraged                   Remeasure/reassess  "PCT  <0.1                 Strongly Discouraged          Remeasure/reassess PCT      As 28 day mortality risk marker: \"Change in Procalcitonin Result\" (> 80 % or <=80 %) if Day 0 (or Day 1) and Day 4 values are available. Refer to http://www.[facility]-pct-calculator.com/   Change in PCT <=80 %   A decrease of PCT levels below or equal to 80 % defines a positive change in PCT test result representing a higher risk for 28-day all-cause mortality of patients diagnosed with severe sepsis or septic shock.  Change in PCT > 80 %   A decrease of PCT levels of more than 80 % defines a negative change in PCT result representing a lower risk for 28-day all-cause mortality of patients diagnosed with severe sepsis or septic shock.                Results may be falsely decreased if patient taking Biotin.     Respiratory Panel PCR w/COVID-19(SARS-CoV-2) [facility] In-House, NP Swab in UTM/VTM, 3-4 HR TAT - Swab, Nasopharynx [[id number]]  (Abnormal) Collected: 02/08/21 2055    Specimen: Swab from Nasopharynx Updated: 02/09/21 0002     ADENOVIRUS, PCR Not Detected     Coronavirus 229E Not Detected     Coronavirus HKU1 Not Detected     Coronavirus NL63 Not Detected     Coronavirus OC43 Not Detected     COVID19 Detected     Human Metapneumovirus Not Detected     Human Rhinovirus/Enterovirus Not Detected     Influenza A PCR Not Detected     Influenza B PCR Not Detected     Parainfluenza Virus 1 Not Detected     Parainfluenza Virus 2 Not Detected     Parainfluenza Virus 3 Not Detected     Parainfluenza Virus 4 Not Detected     RSV, PCR Not Detected     Bordetella pertussis pcr Not Detected     Bordetella parapertussis PCR Not Detected     Chlamydophila pneumoniae PCR Not Detected     Mycoplasma pneumo by PCR Not Detected    Narrative:      Fact sheet for providers: https://docs.[employer]/wp-content/uploads/MIK3740-5841-ZT6.1-EUA-Provider-Fact-Sheet-3.pdf    Fact sheet for patients: " https://docs.[employer]/wp-content/uploads/HSD2514-5680-ZW7.1-EUA-Patient-Fact-Sheet-1.pdf    Test performed by PCR.          Imaging Results (Last 24 Hours)     Procedure Component Value Units Date/Time    CT Head Without Contrast [[id number]] Resulted: 02/08/21 2045     Updated: 02/08/21 2047    XR Chest 1 View [[id number]] Collected: 02/08/21 1903     Updated: 02/08/21 1912    Narrative:      XR CHEST 1 VW-     HISTORY: Male who is 80 years-old,  altered mental status     TECHNIQUE: Frontal view of the chest     COMPARISON: None available     FINDINGS: The heart size is borderline. Infiltrative opacities are  apparent at the right midlung, right base, with atelectasis or  infiltrate at the left base. Appearance may represent multifocal  pneumonia, possibility of underlying lesion is not excluded, follow-up  recommended, CT could be considered for further evaluation. No pleural  effusion, or pneumothorax. No acute osseous process.       Impression:      Multifocal pulmonary opacities may represent pneumonia,  possibility of underlying lesions not excluded, follow-up/further  evaluation recommended as indicated. Borderline heart size.     This report was finalized on 2/8/2021 7:09 PM by Dr. Jake Hearn M.D.                  ECG 12 Lead   Preliminary Result   HEART RATE= 98  bpm   RR Interval= 612  ms   OH Interval=   ms   P Horizontal Axis=   deg   P Front Axis=   deg   QRSD Interval= 134  ms   QT Interval= 386  ms   QRS Axis= -78  deg   T Wave Axis= 56  deg   - ABNORMAL ECG -   Atrial fibrillation   Multiple ventricular premature complexes   RBBB and LAFB   Electronically Signed By:    Date and Time of Study: 2021-02-08 19:10:30           Assessment/Plan     Active Hospital Problems    Diagnosis POA   • **Pneumonia of right middle lobe due to infectious organism [J18.9] Yes   • COVID-19 virus detected [U07.1] Unknown   • Hypernatremia [E87.0] Unknown   • ANIVAL (acute kidney injury) (CMS/HCC) [N17.9] Unknown    • Elevated LFTs [R79.89] Unknown   • Atrial fibrillation (CMS/HCC) [I48.91] Unknown   • Essential hypertension [I10] Unknown   • Dementia (CMS/HCC) [F03.90] Unknown   • BPH (benign prostatic hyperplasia) [N40.0] Unknown   • GERD (gastroesophageal reflux disease) [K21.9] Unknown       Pneumonia of RML/COVID 19/COPD  -His respiratory status appears stable on 3L NC  -Pneumonia due to aspiration? Keep NPO and consult speech therapy  -IS  -He received Rocephin in the ED, but has been afebrile.  Procal is mildly elevated but he is actually leukopenic. Lactate ok  -Blood cultures pending    Hypernatremia/ANIVAL  -Secondary to dehydration  -IVF  -Creat 1.80, no baseline lab work available to compare, but suspect some underlying CKD  -Repeat lab work in AM    Elevated LFTs  -Secondary to dehydration? Will see if improve with IVF resuscitation  -Check hepatitis panel    Atrial Fibrillation/Hypertension  -Rate controlled. Continue Metoprolol for rate control  -Blood pressures stable. Continue home regimen  -He does not appear to be on an oral anticoagulant, most likely secondary to age and high falls risk    Dementia  -He has had aggressive behaviors in the ED  -CT head pending  -Continue home regimen. Will add PRN Zyprexa    BPH  -Continue FLomax    GERD  -Continue Pepcid      -I discussed the patients findings and my recommendations with patient.    VTE Prophylaxis - Pharmacy to dose Lovenox.  Code Status - Full code.       TAVO Esposito  [facility]  02/09/21  06:41 EST

## 2021-02-09 NOTE — PROGRESS NOTES
"Pharmacy Consult - Lovenox    Pt Name: Scott BLACK   Indication:  VTE Prophylaxis  Consulted by:  TAVO Meraz    MRN: [id number]  : 1940  / Age: 80 y.o.  75.8 kg (167 lb)  Body mass index is 22.65 kg/m².    Relevant clinical data and objective history reviewed:  80 y.o. male 182.9 cm (72\") 75.8 kg (167 lb)    Home Anticoagulation:      Past Medical History:   Diagnosis Date   • Anxiety    • Atrial fibrillation (CMS/HCC)    • BPH (benign prostatic hyperplasia)    • Chronic kidney disease    • COPD (chronic obstructive pulmonary disease) (CMS/HCC)    • Dementia (CMS/HCC)    • Depression    • Dysphagia    • Femur fracture, left (CMS/HCC)    • GERD (gastroesophageal reflux disease)    • Gout    • Hypertension    • Insomnia    • Iron deficiency    • Metabolic encephalopathy    • Osteoarthritis    • Urinary retention      has No Known Allergies.    Lab Results   Component Value Date    WBC 2.94 (L) 2021    HGB 15.1 2021    HCT 46.3 2021    MCV 94.1 2021     2021     Lab Results   Component Value Date    GLUCOSE 82 2021    CALCIUM 9.1 2021     (H) 2021    K 4.3 2021    CO2 21.7 (L) 2021     (H) 2021    BUN 74 (H) 2021    CREATININE 1.80 (H) 2021    EGFRIFNONA 36 (L) 2021    BCR 41.1 (H) 2021    ANIONGAP 14.3 2021       Estimated Creatinine Clearance: 33.9 mL/min (A) (by C-G formula based on SCr of 1.8 mg/dL (H)).    Active Inpatient Anticoagulation Orders:      Assessment/Plan    Will start patient on 40 mg subcutaneous every 24 hours, adjusted for renal function.       Renal function will continue to be monitored and dosing adjustments will be made by pharmacy based on renal function if necessary    Neville Amaya [facility]      "

## 2021-02-09 NOTE — SIGNIFICANT NOTE
Patient has history of dementia and is not able to give information regarding admission. [facility] called at [phone], no answer received.

## 2021-02-09 NOTE — DISCHARGE PLACEMENT REQUEST
"Nicho BLACK (80 y.o. Male)     Date of Birth Social Security Number Address Home Phone MRN    1940  [facility]  [address] [address] [phone] [id number]    Church Marital Status          Unknown        Admission Date Admission Type Admitting Provider Attending Provider Department, Room/Bed    2/8/21 Emergency Mike Avery MD [place], Matthew Brett, MD [address], [address]1    Discharge Date Discharge Disposition Discharge Destination                       Attending Provider: Mike Avery MD    Allergies: No Known Allergies    Isolation: Enh Drop/Con   Infection: COVID (confirmed) (02/09/21)   Code Status: CPR    Ht: 182.9 cm (72.01\")   Wt: 75.8 kg (167 lb)    Admission Cmt: None   Principal Problem: Pneumonia of right middle lobe due to infectious organism [J18.9]                 Active Insurance as of 2/8/2021     Primary Coverage     Payor Plan Insurance Group Employer/Plan Group    MEDICARE MEDICARE A & B      Payor Plan Address Payor Plan Phone Number Payor Plan Fax Number Effective Dates    [address] [phone]  8/1/2003 - None Entered    [facility] [id number]       Subscriber Name Subscriber Birth Date Member ID       NIHCO BLACK 1940 [id number]           Secondary Coverage     Payor Plan Insurance Group Employer/Plan Group    [place]      Payor Plan Address Payor Plan Phone Number Payor Plan Fax Number Effective Dates    [address] [phone]  2/8/2021 - None Entered    [place] [address]       Subscriber Name Subscriber Birth Date Member ID       NICHO BLACK 1940 [id number]                 Emergency Contacts      (Rel.) Home Phone Work Phone Mobile Phone    ARMANDO BLACK (Brother) [phone] -- --              "

## 2021-02-09 NOTE — PROGRESS NOTES
Discharge Planning Assessment  [facility]     Patient Name: Scott BLACK  MRN: [id number]  Today's Date: 2/9/2021    Admit Date: 2/8/2021    Discharge Needs Assessment     Row Name 02/09/21 1145       Living Environment    Lives With  facility resident    Current Living Arrangements  extended care facility    Primary Care Provided by  other (see comments)    Provides Primary Care For  no one, unable/limited ability to care for self    Family Caregiver if Needed  none    Quality of Family Relationships  helpful    Able to Return to Prior Arrangements  yes       Resource/Environmental Concerns    Resource/Environmental Concerns  none       Transition Planning    Patient/Family Anticipates Transition to  long-term care facility    Patient/Family Anticipated Services at Transition  rehabilitation services    Transportation Anticipated  health plan transportation       Discharge Needs Assessment    Readmission Within the Last 30 Days  no previous admission in last 30 days    Equipment Currently Used at Home  hospital bed    Concerns to be Addressed  basic needs    Anticipated Changes Related to Illness  none    Discharge Facility/Level of Care Needs  nursing facility, intermediate    Provided Post Acute Provider List?  N/A    N/A Provider List Comment  Has been at [facility] and will return        Discharge Plan     Row Name 02/09/21 1146       Plan    Plan  Return to [facility]    Patient/Family in Agreement with Plan  yes    Plan Comments  Spoke with brother Jesus Alberto Black [phone] by telehpone. Brother states he is guardian - he will try to go to [employer] later today and email paper-work to hospital.  Brother confirms that plan is for patient to return to [facility] at [place].  Spoke with Adriana/[facility], patient is from [facility] with a medicaid bedhold and can return.  Packet started and in CCP office.  CCP will follow.  BHumeniuk RN        Continued Care and Services - Admitted Since  2/8/2021     Destination     Service Provider Request Status Selected Services Address Phone Fax Patient Preferred    [facility]  Accepted N/A [address] [address] [phone] --                Demographic Summary     Row Name 02/09/21 1144       General Information    Admission Type  inpatient    Arrived From  subacute/long-term acute care    Required Notices Provided  Important Message from Medicare    Referral Source  admission list    Reason for Consult  discharge planning    Preferred Language  English     Used During This Interaction  no        Functional Status     Row Name 02/09/21 1144       Functional Status    Usual Activity Tolerance  poor    Current Activity Tolerance  poor       Functional Status, IADL    Medications  completely dependent    Meal Preparation  completely dependent    Housekeeping  completely dependent    Laundry  completely dependent    Shopping  completely dependent       Mental Status    General Appearance WDL  -- Unable to assess       Mental Status Summary    Recent Changes in Mental Status/Cognitive Functioning  unable to assess                Becky S. Humeniuk, RN

## 2021-02-09 NOTE — PLAN OF CARE
Goal Outcome Evaluation:  Plan of Care Reviewed With: patient  Progress: no change  Outcome Summary: Pt remains confused and agitated at times.Restraints bilaterally to prevent pulling lines and equipment removal. NPO due to failed swallow per speech. DNR per facility paperwork. Turned as tolerated. Skin intact other than multiple bruises. Will continue to watch sodium levels. Inc per brief. Will monitor closely.

## 2021-02-09 NOTE — DISCHARGE PLACEMENT REQUEST
"Nicho BLACK (80 y.o. Male)     Date of Birth Social Security Number Address Home Phone MRN    1940  [facility]  [address] [address] [phone] [id number]    Catholic Marital Status          Unknown        Admission Date Admission Type Admitting Provider Attending Provider Department, Room/Bed    2/8/21 Emergency Mike Avery MD [place], Matthew Brett, MD [address], [address]/1    Discharge Date Discharge Disposition Discharge Destination                       Attending Provider: Mike Avery MD    Allergies: No Known Allergies    Isolation: Enh Drop/Con   Infection: COVID (confirmed) (02/09/21)   Code Status: CPR    Ht: 182.9 cm (72.01\")   Wt: 75.8 kg (167 lb)    Admission Cmt: None   Principal Problem: Pneumonia of right middle lobe due to infectious organism [J18.9]                 Active Insurance as of 2/8/2021     Primary Coverage     Payor Plan Insurance Group Employer/Plan Group    MEDICARE MEDICARE A & B      Payor Plan Address Payor Plan Phone Number Payor Plan Fax Number Effective Dates    [address] [phone]  8/1/2003 - None Entered    [facility] [address]       Subscriber Name Subscriber Birth Date Member ID       NICHO BLACK 1940 [id number]           Secondary Coverage     Payor Plan Insurance Group Employer/Plan Group    [place]      Payor Plan Address Payor Plan Phone Number Payor Plan Fax Number Effective Dates    [address] [phone]  2/8/2021 - None Entered    [place] [address]       Subscriber Name Subscriber Birth Date Member ID       NICHO BLACK 1940 [id number]                 Emergency Contacts      (Rel.) Home Phone Work Phone Mobile Phone    ARMANDO BLACK (Brother) [phone] -- --              "

## 2021-02-09 NOTE — ED NOTES
Pt uncooperative to IV start at this time. Pt attempting to hit this RN and to get out of ED bed. Pt placed back in bed with both side rails in place and bed alarm active. Will reattempt IV start shortly.      Juan Lazcano, RN  02/08/21 7366

## 2021-02-09 NOTE — ED NOTES
Nursing report ED to floor  Scott BLACK  80 y.o.  male    HPI (triage note):   Chief Complaint   Patient presents with   • Not Eating       Admitting doctor:   Tiffanie Holman MD    Admitting diagnosis:   The primary encounter diagnosis was Pneumonia of right middle lobe due to infectious organism. Diagnoses of Abnormal chest x-ray, Dehydration, and Failure to thrive in adult were also pertinent to this visit.    Code status:   Current Code Status     Date Active Code Status Order ID Comments User Context       2/9/2021 0154 CPR [id number]  Carrie Fine, APRN ED     Advance Care Planning Activity      Questions for Current Code Status     Question Answer Comment    Code Status CPR     Medical Interventions (Level of Support Prior to Arrest) Full           Allergies:   Patient has no known allergies.    Weight:       02/08/21  1752   Weight: 75.8 kg (167 lb)       Most recent vitals:   Vitals:    02/09/21 0158 02/09/21 0214 02/09/21 0220 02/09/21 0243   BP:  106/81 106/81    BP Location:   Right arm    Patient Position:   Lying    Pulse:   88 89   Resp:   15    Temp:       TempSrc:       SpO2: (!) 88%  91% 99%   Weight:       Height:           Active LDAs/IV Access:   Lines, Drains & Airways    Active LDAs     Name:   Placement date:   Placement time:   Site:   Days:    Peripheral IV 02/09/21 0235 Right Hand   02/09/21    0235    Hand   less than 1                Labs (abnormal labs have a star):   Labs Reviewed   RESPIRATORY PANEL PCR W/ COVID-19 (SARS-COV-2) EL/JUSTICE/MALINDA/PAD/COR/MAD/TED IN-HOUSE, NP SWAB IN UTM/VTP, 3-4 HR TAT - Abnormal; Notable for the following components:       Result Value    COVID19 Detected (*)     All other components within normal limits    Narrative:     Fact sheet for providers: https://docs.[employer]/wp-content/uploads/PBI7943-8264-BH5.1-EUA-Provider-Fact-Sheet-3.pdf    Fact sheet for patients:  "https://docs.[employer]/wp-content/uploads/QUP6880-0704-DZ7.1-EUA-Patient-Fact-Sheet-1.pdf    Test performed by PCR.   COMPREHENSIVE METABOLIC PANEL - Abnormal; Notable for the following components:    BUN 74 (*)     Creatinine 1.80 (*)     Sodium 152 (*)     Chloride 116 (*)     CO2 21.7 (*)     Albumin 3.40 (*)     ALT (SGPT) 373 (*)     AST (SGOT) 390 (*)     Alkaline Phosphatase 129 (*)     eGFR Non African Amer 36 (*)     BUN/Creatinine Ratio 41.1 (*)     All other components within normal limits    Narrative:     GFR Normal >60  Chronic Kidney Disease <60  Kidney Failure <15     LIPASE - Abnormal; Notable for the following components:    Lipase 63 (*)     All other components within normal limits   CBC WITH AUTO DIFFERENTIAL - Abnormal; Notable for the following components:    WBC 2.94 (*)     Neutrophil % 41.2 (*)     Monocyte % 14.6 (*)     Immature Grans % 2.4 (*)     Neutrophils, Absolute 1.21 (*)     Immature Grans, Absolute 0.07 (*)     All other components within normal limits   PROCALCITONIN - Abnormal; Notable for the following components:    Procalcitonin 0.35 (*)     All other components within normal limits    Narrative:     As a Marker for Sepsis (Non-Neonates):   1. <0.5 ng/mL represents a low risk of severe sepsis and/or septic shock.  1. >2 ng/mL represents a high risk of severe sepsis and/or septic shock.    As a Marker for Lower Respiratory Tract Infections that require antibiotic therapy:  PCT on Admission     Antibiotic Therapy             6-12 Hrs later  > 0.5                Strongly Recommended            >0.25 - <0.5         Recommended  0.1 - 0.25           Discouraged                   Remeasure/reassess PCT  <0.1                 Strongly Discouraged          Remeasure/reassess PCT      As 28 day mortality risk marker: \"Change in Procalcitonin Result\" (> 80 % or <=80 %) if Day 0 (or Day 1) and Day 4 values are available. Refer to http://www.[employer]-pct-calculator.com/   Change in PCT " <=80 %   A decrease of PCT levels below or equal to 80 % defines a positive change in PCT test result representing a higher risk for 28-day all-cause mortality of patients diagnosed with severe sepsis or septic shock.  Change in PCT > 80 %   A decrease of PCT levels of more than 80 % defines a negative change in PCT result representing a lower risk for 28-day all-cause mortality of patients diagnosed with severe sepsis or septic shock.                Results may be falsely decreased if patient taking Biotin.    TROPONIN (IN-HOUSE) - Normal    Narrative:     Troponin T Reference Range:  <= 0.03 ng/mL-   Negative for AMI  >0.03 ng/mL-     Abnormal for myocardial necrosis.  Clinicians would have to utilize clinical acumen, EKG, Troponin and serial changes to determine if it is an Acute Myocardial Infarction or myocardial injury due to an underlying chronic condition.       Results may be falsely decreased if patient taking Biotin.     LACTIC ACID, PLASMA - Normal   BLOOD CULTURE   BLOOD CULTURE   RAINBOW DRAW    Narrative:     The following orders were created for panel order [place] Draw.  Procedure                               Abnormality         Status                     ---------                               -----------         ------                     Light Blue Top[[id number]]                                   Final result               Green Top (Gel)[[id number]]                                  Final result               Lavender Top[[id number]]                                     Final result               Gold Top - SST[[id number]]                                   Final result                 Please view results for these tests on the individual orders.   URINALYSIS W/ MICROSCOPIC IF INDICATED (NO CULTURE)   FERRITIN   LACTATE DEHYDROGENASE   D-DIMER, QUANTITATIVE   PROCALCITONIN   CBC AND DIFFERENTIAL    Narrative:     The following orders were created for panel order CBC & Differential.  Procedure                                Abnormality         Status                     ---------                               -----------         ------                     CBC Auto Differential[[id number]]        Abnormal            Final result                 Please view results for these tests on the individual orders.   LIGHT BLUE TOP   GREEN TOP   LAVENDER TOP   GOLD TOP - [facility]       EKG:   ECG 12 Lead   Preliminary Result   HEART RATE= 98  bpm   RR Interval= 612  ms   NC Interval=   ms   P Horizontal Axis=   deg   P Front Axis=   deg   QRSD Interval= 134  ms   QT Interval= 386  ms   QRS Axis= -78  deg   T Wave Axis= 56  deg   - ABNORMAL ECG -   Atrial fibrillation   Multiple ventricular premature complexes   RBBB and LAFB   Electronically Signed By:    Date and Time of Study: 2021-02-08 19:10:30          Meds given in ED:   Medications   sodium chloride 0.9 % flush 10 mL (has no administration in time range)   sodium chloride 0.9 % flush 10 mL (has no administration in time range)   sodium chloride 0.9 % flush 10 mL (has no administration in time range)   Pharmacy to Dose enoxaparin (LOVENOX) (has no administration in time range)   nitroglycerin (NITROSTAT) SL tablet 0.4 mg (has no administration in time range)   sodium chloride 0.9 % infusion (has no administration in time range)   acetaminophen (TYLENOL) tablet 650 mg (has no administration in time range)     Or   acetaminophen (TYLENOL) 160 MG/5ML solution 650 mg (has no administration in time range)     Or   acetaminophen (TYLENOL) suppository 650 mg (has no administration in time range)   ondansetron (ZOFRAN) injection 4 mg (has no administration in time range)   enoxaparin (LOVENOX) syringe 40 mg (has no administration in time range)   sodium chloride 0.9 % bolus 1,000 mL (0 mL Intravenous Stopped 2/8/21 2239)   cefTRIAXone (ROCEPHIN) IVPB 1 g (0 g Intravenous Stopped 2/9/21 0222)   sodium chloride 0.9 % bolus 1,000 mL (0 mL Intravenous Stopped 2/9/21 0222)   LORazepam  (ATIVAN) injection 1 mg (1 mg Intramuscular Given 2/8/21 6816)   OLANZapine (zyPREXA) injection 5 mg (5 mg Intramuscular Given 2/9/21 0589)       Imaging results:  Xr Chest 1 View    Result Date: 2/8/2021  Multifocal pulmonary opacities may represent pneumonia, possibility of underlying lesions not excluded, follow-up/further evaluation recommended as indicated. Borderline heart size.  This report was finalized on 2/8/2021 7:09 PM by Dr. Jake Hearn M.D.        Ambulatory status:   - bed rest     Social issues:   Social History     Socioeconomic History   • Marital status:      Spouse name: Not on file   • Number of children: Not on file   • Years of education: Not on file   • Highest education level: Not on file   Tobacco Use   • Smoking status: Unknown If Ever Smoked   Substance and Sexual Activity   • Alcohol use: Not Currently   • Drug use: Not Currently   • Sexual activity: Not Currently          Gabby Curry, RN  02/09/21 0243

## 2021-02-10 NOTE — PLAN OF CARE
Goal Outcome Evaluation:  Plan of Care Reviewed With: patient  Progress: no change  Outcome Summary: Pt attempting to verbalize at times. Remains confused, trying to leave bed at times. Restaints renewed bilateral soft. Cleared by speech for mech. soft with honey thick. Will continue to monitor.

## 2021-02-10 NOTE — PLAN OF CARE
Goal Outcome Evaluation:  Plan of Care Reviewed With: patient     Outcome Summary: Swallow reevaluated. Pt tolerated small ice chips with no s/s. With teaspoon trial of water, swallow initiated with slightly delayed coughing noted. Pt tolerated trials of HTL (tsp, cup) and pureed with no overt s/s. Pt swallowed twice with all trials (needed extra time for second swallow). Pt talked throughout eval with no change in vocal quality. Recommend pureed and HTL; meds crushed in pureed; upright for meals and 30 min after; slow rate; small bites/sips; allow extra time for swallowing; alternate solids/liquids every 1-2 bites; assist with feeding. ST to follow for diet tolerance. If s/s of aspiration are noted, make NPO.

## 2021-02-10 NOTE — THERAPY RE-EVALUATION
Acute Care - Speech Language Pathology   Swallow Re-Evaluation [facility]     Patient Name: Scott BLACK  : 1940  MRN: [id number]  Today's Date: 2/10/2021               Admit Date: 2021    Visit Dx:     ICD-10-CM ICD-9-CM   1. Pneumonia of right middle lobe due to infectious organism  J18.9 486   2. Abnormal chest x-ray  R93.89 793.2   3. Dehydration  E86.0 276.51   4. Failure to thrive in adult  R62.7 783.7     Patient Active Problem List   Diagnosis   • Pneumonia of right middle lobe due to infectious organism   • Pneumonia due to COVID-19 virus   • COVID-19 virus detected   • Hypernatremia   • ANIVAL (acute kidney injury) (CMS/HCC)   • Elevated LFTs   • Atrial fibrillation (CMS/HCC)   • Essential hypertension   • Dementia (CMS/HCC)   • BPH (benign prostatic hyperplasia)   • GERD (gastroesophageal reflux disease)   • Metabolic encephalopathy     Past Medical History:   Diagnosis Date   • Anxiety    • Atrial fibrillation (CMS/HCC)    • BPH (benign prostatic hyperplasia)    • Chronic kidney disease    • COPD (chronic obstructive pulmonary disease) (CMS/HCC)    • Dementia (CMS/HCC)    • Depression    • Dysphagia    • Femur fracture, left (CMS/HCC)    • GERD (gastroesophageal reflux disease)    • Gout    • Hypertension    • Insomnia    • Iron deficiency    • Metabolic encephalopathy    • Osteoarthritis    • Urinary retention      History reviewed. No pertinent surgical history.     Patient was not wearing a face mask during this therapy encounter. Therapist used appropriate personal protective equipment including gown, eye protection, mask and gloves.  Mask used was N95/duckbill. Appropriate PPE was worn during the entire therapy session. Hand hygiene was completed before and after therapy session. Patient is in enhanced droplet precautions.          SWALLOW EVALUATION (last 72 hours)      SLP Adult Swallow Evaluation     Row Name 02/10/21 1510 21 1600                Rehab Evaluation    Document  Type  re-evaluation  -AW  evaluation  -AW       Subjective Information  no complaints  -AW  no complaints  -AW       Patient Observations  alert;cooperative;agree to therapy  -AW  alert;cooperative;agree to therapy  -AW       Patient/Family/Caregiver Comments/Observations  Pt more alert, followed some commands and had a few intelligible words this date.  -AW  Pt upright in bed, unable to follow commands, speech garbled.  -AW       Care Plan Review  evaluation/treatment results reviewed;patient/other agree to care plan  -AW  evaluation/treatment results reviewed;patient/other agree to care plan  -AW       Patient Effort  good  -AW  adequate  -AW       Symptoms Noted During/After Treatment  none  -AW  none  -AW          General Information    Patient Profile Reviewed  yes  -AW  yes  -AW       Pertinent History Of Current Problem  Pt admitted from NH w/ decreased intake, Covid 19 PNA, h/o COPD, GERD, dementia  -AW  Pt admitted from NH w/ decreased intake, Covid 19 PNA, h/o COPD, GERD, dementia  -AW       Current Method of Nutrition  NPO  -AW  NPO  -AW       Precautions/Limitations, Vision  WFL;for purposes of eval  -AW  WFL;for purposes of eval  -AW       Precautions/Limitations, Hearing  WFL;for purposes of eval  -AW  WFL;for purposes of eval  -AW       Prior Level of Function-Communication  cognitive-linguistic impairment  -AW  cognitive-linguistic impairment  -AW       Prior Level of Function-Swallowing  mechanical soft textures;thin liquids  -AW  mechanical soft textures;thin liquids  -AW       Plans/Goals Discussed with  patient;agreed upon  -AW  patient  -AW       Barriers to Rehab  cognitive status  -AW  cognitive status  -AW       Patient's Goals for Discharge  patient did not state  -AW  patient did not state  -AW          Oral Motor Structure and Function    Secretion Management  --  WNL/WFL  -AW       Mucosal Quality  --  moist, healthy  -AW       Volitional Swallow  --  unable to elicit  -AW          Oral  Musculature and Cranial Nerve Assessment    Oral Motor General Assessment  unable to assess  -AW  unable to assess  -AW          General Eating/Swallowing Observations    Respiratory Support Currently in Use  room air  -AW  nasal cannula  -AW       Eating/Swallowing Skills  fed by SLP  -AW  fed by SLP  -AW       Positioning During Eating  upright in bed  -AW  upright in bed  -AW       Utensils Used  spoon;cup  -AW  spoon;cup  -AW       Consistencies Trialed  ice chips;thin liquids;honey-thick liquids;pureed  -AW  ice chips;nectar/syrup-thick liquids;honey-thick liquids;pureed  -AW       Pre SpO2 (%)  --  98  -AW       Post SpO2 (%)  --  98  -AW          Clinical Swallow Eval    Oral Transit  --  impaired  -AW       Pharyngeal Phase  --  suspected pharyngeal impairment  -AW       Clinical Swallow Evaluation Summary  Swallow reevaluated. Pt tolerated small ice chips with no s/s. With teaspoon trial of water, swallow initiated with slightly delayed coughing noted. Pt tolerated trials of HTL (tsp, cup) and pureed with no overt s/s. Pt swallowed twice with all trials (needed extra time for second swallow). Pt talked throughout eval with no change in vocal quality. Recommend pureed and HTL; meds crushed in pureed; upright for meals and 30 min after; slow rate; small bites/sips; allow extra time for swallowing; alternate solids/liquids every 1-2 bites; assist with feeding. ST to follow for diet tolerance.   -AW  Clinical swallow eval completed. Swallow was initiated with small ice chips w/ weak cough noted. Pt given trials of NTL (tsp), HTL (tsp, cup), and pureed.  Swallow was significantly delayed, with premature spillage suspected. Pt swallowed 2-3 times with each trial. Laryngeal elevation was adequate but sluggish. After pureed trial, coughing began with 2 harsh sneezes, and then more weak coughing. Suspect pharyngeal residuals and likely penetration/aspiration. Pt is not ready for PO diet. REC NPO w/ meds via  alternative; frequent oral care. ST to f/u as pt's labs improve for reeval.   -AW          Clinical Impression    SLP Swallowing Diagnosis  oral dysphagia;suspected pharyngeal dysphagia  -AW  oral dysphagia;suspected pharyngeal dysphagia  -AW       Functional Impact  risk of aspiration/pneumonia  -AW  risk of aspiration/pneumonia  -AW       Rehab Potential/Prognosis, Swallowing  good, to achieve stated therapy goals  -AW  adequate, monitor progress closely  -AW       Swallow Criteria for Skilled Therapeutic Interventions Met  demonstrates skilled criteria  -AW  demonstrates skilled criteria  -AW          Recommendations    Therapy Frequency (Swallow)  PRN  -AW  PRN  -AW       Predicted Duration Therapy Intervention (Days)  until discharge  -AW  until discharge  -AW       SLP Diet Recommendation  puree;honey thick liquids  -AW  NPO  -AW       Recommended Precautions and Strategies  upright posture during/after eating;small bites of food and sips of liquid;alternate between small bites of food and sips of liquid;multiple swallows per bite of food;multiple swallows per sip of liquid;assist with feeding  -AW  --       Oral Care Recommendations  Oral Care before breakfast, after meals and PRN  -AW  Oral Care BID/PRN  -AW       SLP Rec. for Method of Medication Administration  meds crushed;with pudding or applesauce  -AW  meds via alternate route  -AW       Monitor for Signs of Aspiration  yes;notify SLP if any concerns  -AW  yes;notify SLP if any concerns  -AW       Anticipated Discharge Disposition (SLP)  extended care facility  -AW  extended care facility  -AW          Swallow Goals (SLP)    Oral Nutrition/Hydration Goal Selection (SLP)  --  oral nutrition/hydration, SLP goal 1  -AW          Oral Nutrition/Hydration Goal 1 (SLP)    Oral Nutrition/Hydration Goal 1, SLP  --  Pt will safely tolerate the least restrictive PO diet.  -AW       Time Frame (Oral Nutrition/Hydration Goal 1, SLP)  --  by discharge  -AW          User Key  (r) = Recorded By, (t) = Taken By, (c) = Cosigned By    Initials Name Effective Dates    Mignon Emmanuel MS CCC-SLP 06/08/18 -           EDUCATION  The patient has been educated in the following areas:   Dysphagia (Swallowing Impairment) Oral Care/Hydration Modified Diet Instruction.    SLP Recommendation and Plan  SLP Swallowing Diagnosis: oral dysphagia, suspected pharyngeal dysphagia  SLP Diet Recommendation: puree, honey thick liquids  Recommended Precautions and Strategies: upright posture during/after eating, small bites of food and sips of liquid, alternate between small bites of food and sips of liquid, multiple swallows per bite of food, multiple swallows per sip of liquid, assist with feeding  SLP Rec. for Method of Medication Administration: meds crushed, with pudding or applesauce     Monitor for Signs of Aspiration: yes, notify SLP if any concerns     Swallow Criteria for Skilled Therapeutic Interventions Met: demonstrates skilled criteria  Anticipated Discharge Disposition (SLP): extended care facility  Rehab Potential/Prognosis, Swallowing: good, to achieve stated therapy goals  Therapy Frequency (Swallow): PRN  Predicted Duration Therapy Intervention (Days): until discharge                         Plan of Care Reviewed With: patient  Outcome Summary: Swallow reevaluated. Pt tolerated small ice chips with no s/s. With teaspoon trial of water, swallow initiated with slightly delayed coughing noted. Pt tolerated trials of HTL (tsp, cup) and pureed with no overt s/s. Pt swallowed twice with all trials (needed extra time for second swallow). Pt talked throughout eval with no change in vocal quality. Recommend pureed and HTL; meds crushed in pureed; upright for meals and 30 min after; slow rate; small bites/sips; allow extra time for swallowing; alternate solids/liquids every 1-2 bites; assist with feeding. ST to follow for diet tolerance.    SLP GOALS     Row Name 02/09/21 1600              Oral Nutrition/Hydration Goal 1 (SLP)    Oral Nutrition/Hydration Goal 1, SLP  Pt will safely tolerate the least restrictive PO diet.  -AW      Time Frame (Oral Nutrition/Hydration Goal 1, SLP)  by discharge  -AW        User Key  (r) = Recorded By, (t) = Taken By, (c) = Cosigned By    Initials Name Provider Type    Mignon Emmanuel MS CCC-SLP Speech and Language Pathologist           SLP Outcome Measures (last 72 hours)      SLP Outcome Measures     Row Name 02/10/21 1500 02/09/21 1600          SLP Outcome Measures    Outcome Measure Used?  Adult NOMS  -AW  Adult NOMS  -AW        Adult FCM Scores    FCM Chosen  Swallowing  -AW  Swallowing  -AW     Swallowing FCM Score  3  -AW  1  -AW       User Key  (r) = Recorded By, (t) = Taken By, (c) = Cosigned By    Initials Name Effective Dates    Mignon Emmanuel MS CCC-SLP 06/08/18 -            Time Calculation:   Time Calculation- SLP     Row Name 02/10/21 1530             Time Calculation- SLP    SLP Start Time  1400  -AW      SLP Received On  02/10/21  -AW        User Key  (r) = Recorded By, (t) = Taken By, (c) = Cosigned By    Initials Name Provider Type    Mignon Emmanuel MS CCC-SLP Speech and Language Pathologist          Therapy Charges for Today     Code Description Service Date Service Provider Modifiers Qty    70182335026 HC ST EVAL ORAL PHARYNG SWALLOW 4 2/9/2021 Mignon Neves MS CCC-SLP GN 1    96866631533 HC ST TREATMENT SWALLOW 4 2/10/2021 Mignon Neves MS CCC-SLP GN 1               Mignon Neves MS CCC-SLP  2/10/2021

## 2021-02-10 NOTE — PROGRESS NOTES
Name: Scott BLACK ADMIT: 2021   : 1940  PCP: Provider, No Known    MRN: [id number] LOS: 1 days   AGE/SEX: 80 y.o. male  ROOM: [facility]     Subjective   Subjective   Looks about the same, speech hard to understand    Review of Systems   Unable to perform ROS: Dementia        Objective   Objective   Vital Signs  Temp:  [95.7 °F (35.4 °C)-97.9 °F (36.6 °C)] 97.3 °F (36.3 °C)  Heart Rate:  [70-85] 85  Resp:  [16-18] 18  BP: ()/(61-93) 103/72  SpO2:  [93 %-98 %] 95 %  on  Flow (L/min):  [3] 3;   Device (Oxygen Therapy): nasal cannula  Body mass index is 22.64 kg/m².  Physical Exam  Vitals signs reviewed.   Constitutional:       General: He is not in acute distress.     Appearance: He is ill-appearing.   HENT:      Head: Normocephalic and atraumatic.      Mouth/Throat:      Mouth: Mucous membranes are dry.      Pharynx: No oropharyngeal exudate.   Eyes:      General: No scleral icterus.  Neck:      Musculoskeletal: Neck supple.      Vascular: No JVD.   Cardiovascular:      Rate and Rhythm: Normal rate and regular rhythm.      Heart sounds: No murmur.   Pulmonary:      Effort: Pulmonary effort is normal. No respiratory distress.      Breath sounds: Rhonchi present. No wheezing.   Abdominal:      General: Bowel sounds are normal. There is no distension.      Palpations: Abdomen is soft.      Tenderness: There is no abdominal tenderness.   Skin:     General: Skin is dry.      Findings: No rash.   Neurological:      Mental Status: He is alert.      Comments: Speech garbled, slightly agitated         Results Review     I reviewed the patient's new clinical results.  Results from last 7 days   Lab Units 02/10/21  1038 21   WBC 10*3/mm3 2.77* 2.94*   HEMOGLOBIN g/dL 14.5 15.1   PLATELETS 10*3/mm3 155 154     Results from last 7 days   Lab Units 02/10/21  1038 21  1007 21   SODIUM mmol/L 150* 156* 152*   POTASSIUM mmol/L 3.8 4.3 4.3   CHLORIDE mmol/L 117* 120* 116*   CO2 mmol/L  23.0 22.2 21.7*   BUN mg/dL 44* 63* 74*   CREATININE mg/dL 1.21 1.59* 1.80*   GLUCOSE mg/dL 99 89 82   Estimated Creatinine Clearance: 50.5 mL/min (by C-G formula based on SCr of 1.21 mg/dL).  Results from last 7 days   Lab Units 02/10/21  1038 02/08/21  2044   ALBUMIN g/dL 3.20* 3.40*   BILIRUBIN mg/dL 0.6 0.6   ALK PHOS U/L 135* 129*   AST (SGOT) U/L 156* 390*   ALT (SGPT) U/L 215* 373*     Results from last 7 days   Lab Units 02/10/21  1038 02/09/21  1007 02/08/21  2044   CALCIUM mg/dL 8.7 8.6 9.1   ALBUMIN g/dL 3.20*  --  3.40*     Results from last 7 days   Lab Units 02/09/21  1007 02/08/21  2044   PROCALCITONIN ng/mL 0.27* 0.35*   LACTATE mmol/L  --  1.4     COVID19   Date Value Ref Range Status   02/08/2021 Detected (C) Not Detected - Ref. Range Final     No results found for: HGBA1C, POCGLU    XR Chest 1 View  Narrative: XR CHEST 1 VW-     HISTORY: Male who is 80 years-old,  altered mental status     TECHNIQUE: Frontal view of the chest     COMPARISON: None available     FINDINGS: The heart size is borderline. Infiltrative opacities are  apparent at the right midlung, right base, with atelectasis or  infiltrate at the left base. Appearance may represent multifocal  pneumonia, possibility of underlying lesion is not excluded, follow-up  recommended, CT could be considered for further evaluation. No pleural  effusion, or pneumothorax. No acute osseous process.     Impression: Multifocal pulmonary opacities may represent pneumonia,  possibility of underlying lesions not excluded, follow-up/further  evaluation recommended as indicated. Borderline heart size.     This report was finalized on 2/8/2021 7:09 PM by Dr. Jake Hearn M.D.       Scheduled Medications  aspirin, 81 mg, Oral, Daily  busPIRone, 5 mg, Oral, Daily  carBAMazepine, 400 mg, Oral, TID  cefTRIAXone, 1 g, Intravenous, Q24H  dexamethasone, 6 mg, Intravenous, Daily  docusate sodium, 100 mg, Oral, BID  enoxaparin, 40 mg, Subcutaneous,  Q24H  famotidine, 20 mg, Oral, Daily  memantine, 5 mg, Oral, Q12H  metoprolol succinate XL, 25 mg, Oral, Daily  saccharomyces boulardii, 250 mg, Oral, BID  sodium chloride, 10 mL, Intravenous, Q12H  tamsulosin, 0.8 mg, Oral, Daily  traZODone, 50 mg, Oral, Nightly    Infusions  dextrose, 100 mL/hr, Last Rate: 100 mL/hr (02/10/21 1055)    Diet  NPO Diet       Assessment/Plan     Active Hospital Problems    Diagnosis  POA   • **Pneumonia of right middle lobe due to infectious organism [J18.9]  Yes   • COVID-19 virus detected [U07.1]  Unknown   • Hypernatremia [E87.0]  Unknown   • ANIVAL (acute kidney injury) (CMS/HCC) [N17.9]  Unknown   • Elevated LFTs [R79.89]  Unknown   • Atrial fibrillation (CMS/HCC) [I48.91]  Unknown   • Essential hypertension [I10]  Unknown   • Dementia (CMS/HCC) [F03.90]  Unknown   • BPH (benign prostatic hyperplasia) [N40.0]  Unknown   • GERD (gastroesophageal reflux disease) [K21.9]  Unknown   • Metabolic encephalopathy [G93.41]  Unknown      Resolved Hospital Problems   No resolved problems to display.       80 y.o. male admitted with Pneumonia of right middle lobe due to infectious organism, COVID-19 and severe dehydration causing hypernatremia and renal failure    · Pneumonia-likely viral.  He had already received doxycycline prior to admission and had been on Rocephin on arrival here.  Procalcitonin marginally elevated but I am going to go ahead and stop that now as I feel this is probably a viral pneumonia  · Continue dexamethasone.  I did not start him on remdesivir due to the renal and liver issues and I am going to continue to hold it since his sats are actually marginally better than they were yesterday and he seems to be stable  · Monitor inflammatory markers  · Hypernatremia improving somewhat.  Continue D5W  · ANIVAL, resolved  · Transaminitis-very high on admit.  Seems to be improving.  Likely secondary to Covid  · Dementia with history of aggressive/agitated behaviors-continue  restraints as needed to prevent him from pulling out oxygen and IV lines  · Not really able to pass a swallow evaluation yet.  Speech following  · We will change what meds I can do IV  · Lovenox 40 mg SC daily for DVT prophylaxis.  · DNR.  Talked with his brother by phone.  Probably would consider palliative care if he does not start to improve enough to where he can eat in the next day or so        Mike Avery MD  [facility]  02/10/21  14:57 EST    I wore protective equipment throughout this patient encounter including a face mask, gloves and protective eyewear.  Hand hygiene was performed before donning protective equipment and after removal when leaving the room.

## 2021-02-11 NOTE — PLAN OF CARE
Goal Outcome Evaluation:  Plan of Care Reviewed With: patient     Outcome Summary: Patient vss, on 2L O2 NC. Controlled AFIB on the monitor, provider made aware. Pt continues to be only alert to self, pt continuously trying to get out of bed and attempting to pull at IV and O2 tubing. PRN zyprexa given for increased anxiety and agitation, medication effective but for only a short time. Q2hr turns and incontinence care provided. Will continue to monitor.

## 2021-02-11 NOTE — PLAN OF CARE
Goal Outcome Evaluation:     Progress: no change  Outcome Summary: Pt transferred to [facility] for comfort care. Medicated with 2mg morphine and 0.5mg ativan to maintain comfort. Unable to place F/C, incontinence care provided. Noe preez to monitor

## 2021-02-11 NOTE — PROGRESS NOTES
D/w Palliative Care RN (Bert)  She spoke with pt's brother and decision was made to change goals of care to comfort only.  Transfer to [facility] with full palliative order set in place.

## 2021-02-11 NOTE — PROGRESS NOTES
Name: Scott BLACK ADMIT: 2021   : 1940  PCP: Provider, No Known    MRN: [id number] LOS: 2 days   AGE/SEX: 80 y.o. male  ROOM: [facility]     Subjective   Subjective   Pt very lethargic this AM per RN. Called by RN as pt required sternal rub to even open eyes. Pt much improved at present, will open eyes to name, can answer yes/no questions. Remains awake easily. Got dose of IM Zyprexa last night around 9:20. Denies CP or SOA. Denies cough. Denies abd pain.    Review of Systems   Unable to perform ROS: Acuity of condition        Objective   Objective   Vital Signs  Temp:  [96.4 °F (35.8 °C)-98.3 °F (36.8 °C)] 98.3 °F (36.8 °C)  Heart Rate:  [74-90] 90  Resp:  [16-18] 16  BP: ()/(58-97) 98/70  SpO2:  [92 %-96 %] 96 %  on  Flow (L/min):  [2-3] 2;   Device (Oxygen Therapy): nasal cannula  Body mass index is 22.64 kg/m².  Physical Exam  Vitals signs and nursing note reviewed.   Constitutional:       General: He is not in acute distress.     Appearance: He is normal weight. He is ill-appearing. He is not toxic-appearing or diaphoretic.      Comments: Somnolent, awakens to voice   HENT:      Head: Normocephalic and atraumatic.      Right Ear: External ear normal.      Left Ear: External ear normal.      Nose: Nose normal.      Mouth/Throat:      Mouth: Mucous membranes are dry.      Pharynx: Oropharynx is clear. No oropharyngeal exudate.   Eyes:      General: No scleral icterus.        Right eye: No discharge.         Left eye: No discharge.      Extraocular Movements: Extraocular movements intact.      Conjunctiva/sclera: Conjunctivae normal.   Neck:      Musculoskeletal: No neck rigidity.   Cardiovascular:      Rate and Rhythm: Normal rate and regular rhythm.      Heart sounds: No murmur.   Pulmonary:      Effort: Pulmonary effort is normal. No respiratory distress.      Breath sounds: No stridor. No wheezing.      Comments: Coarse upper airway noise  Chest:      Chest wall: No tenderness.   Abdominal:       General: Abdomen is flat. Bowel sounds are normal. There is no distension.      Palpations: Abdomen is soft.      Tenderness: There is no abdominal tenderness.   Musculoskeletal:         General: No swelling or deformity.   Lymphadenopathy:      Cervical: No cervical adenopathy.   Skin:     General: Skin is warm and dry.      Capillary Refill: Capillary refill takes less than 2 seconds.   Neurological:      Comments: Somnolent, poorly oriented, moves all 4 equally   Psychiatric:      Comments: Unable to assess         Results Review     I reviewed the patient's new clinical results.  Results from last 7 days   Lab Units 02/11/21  0742 02/10/21  1038 02/08/21  2044   WBC 10*3/mm3 4.09 2.77* 2.94*   HEMOGLOBIN g/dL 13.7 14.5 15.1   PLATELETS 10*3/mm3 156 155 154     Results from last 7 days   Lab Units 02/11/21  0742 02/10/21  1038 02/09/21  1007 02/08/21  2044   SODIUM mmol/L 142 150* 156* 152*   POTASSIUM mmol/L 3.9 3.8 4.3 4.3   CHLORIDE mmol/L 111* 117* 120* 116*   CO2 mmol/L 21.2* 23.0 22.2 21.7*   BUN mg/dL 30* 44* 63* 74*   CREATININE mg/dL 1.10 1.21 1.59* 1.80*   GLUCOSE mg/dL 112* 99 89 82   Estimated Creatinine Clearance: 55.5 mL/min (by C-G formula based on SCr of 1.1 mg/dL).  Results from last 7 days   Lab Units 02/11/21  0742 02/10/21  1038 02/08/21  2044   ALBUMIN g/dL 2.80* 3.20* 3.40*   BILIRUBIN mg/dL 0.7 0.6 0.6   ALK PHOS U/L 127* 135* 129*   AST (SGOT) U/L 90* 156* 390*   ALT (SGPT) U/L 150* 215* 373*     Results from last 7 days   Lab Units 02/11/21  0742 02/10/21  1038 02/09/21  1007 02/08/21  2044   CALCIUM mg/dL 8.3* 8.7 8.6 9.1   ALBUMIN g/dL 2.80* 3.20*  --  3.40*     Results from last 7 days   Lab Units 02/09/21 1007 02/08/21  2044   PROCALCITONIN ng/mL 0.27* 0.35*   LACTATE mmol/L  --  1.4     COVID19   Date Value Ref Range Status   02/08/2021 Detected (C) Not Detected - Ref. Range Final     No results found for: HGBA1C, POCGLU    XR Chest 1 View  Narrative: XR CHEST 1 VW-      HISTORY: Male who is 80 years-old,  altered mental status     TECHNIQUE: Frontal view of the chest     COMPARISON: None available     FINDINGS: The heart size is borderline. Infiltrative opacities are  apparent at the right midlung, right base, with atelectasis or  infiltrate at the left base. Appearance may represent multifocal  pneumonia, possibility of underlying lesion is not excluded, follow-up  recommended, CT could be considered for further evaluation. No pleural  effusion, or pneumothorax. No acute osseous process.     Impression: Multifocal pulmonary opacities may represent pneumonia,  possibility of underlying lesions not excluded, follow-up/further  evaluation recommended as indicated. Borderline heart size.     This report was finalized on 2/8/2021 7:09 PM by Dr. Jake Hearn M.D.       Scheduled Medications  aspirin, 81 mg, Oral, Daily  busPIRone, 5 mg, Oral, Daily  carBAMazepine, 400 mg, Oral, TID  dexamethasone, 6 mg, Intravenous, Daily  docusate sodium, 100 mg, Oral, BID  enoxaparin, 40 mg, Subcutaneous, Q24H  famotidine, 20 mg, Intravenous, Q12H  memantine, 5 mg, Oral, Q12H  saccharomyces boulardii, 250 mg, Oral, BID  sodium chloride, 10 mL, Intravenous, Q12H  tamsulosin, 0.8 mg, Oral, Daily  traZODone, 50 mg, Oral, Nightly    Infusions  dextrose, 100 mL/hr, Last Rate: 100 mL/hr (02/11/21 0640)    Diet  Diet Dysphagia; II - Pureed; Honey Thick       Assessment/Plan     Active Hospital Problems    Diagnosis  POA   • **Pneumonia of right middle lobe due to infectious organism [J18.9]  Yes   • COVID-19 virus detected [U07.1]  Unknown   • Hypernatremia [E87.0]  Unknown   • ANIVAL (acute kidney injury) (CMS/HCC) [N17.9]  Unknown   • Elevated LFTs [R79.89]  Unknown   • Atrial fibrillation (CMS/HCC) [I48.91]  Unknown   • Essential hypertension [I10]  Unknown   • Dementia (CMS/HCC) [F03.90]  Unknown   • BPH (benign prostatic hyperplasia) [N40.0]  Unknown   • GERD (gastroesophageal reflux disease)  [K21.9]  Unknown   • Metabolic encephalopathy [G93.41]  Unknown      Resolved Hospital Problems   No resolved problems to display.       80 y.o. male admitted with pneumonia of right middle lobe due to infectious organism, COVID-19, and severe dehydration causing hypernatremia and renal failure.     · Pneumonia-likely viral.  He had already received doxycycline prior to admission and had been on Rocephin on arrival here.  Procalcitonin marginally elevated but Rocephin stopped 2/10 as bacterial PNA felt to be unlikely. Blood cultures NGTD. Afebrile.  ? Continue dexamethasone.  Not started on remdesivir due to the renal and liver issues and I am going to continue to hold it since his sats are actually marginally better than they were yesterday and he seems to be stable. Currently on 2L/min by NC.  ? Monitoring inflammatory markers  · Hypernatremia--resolved this AM. Continue IVFs at lower rate for now as po intake poor. Change D5W to D5 1/2 NS.  · ANIVAL--resolved, Cr wnl today  · Transaminitis-very high on admit.  LFTs falling steadily here. TBili wnl.  Likely secondary to COViD.  · Dementia with history of aggressive/agitated behaviors-continue restraints as needed to prevent him from pulling out oxygen and IV lines, required dose of Zyprexa last night due to agitation. LOC decreased this AM. No hypercapnia on ABG ordered this AM stat.  ? Passed swallow eval yesterday, but currently NPO due to decreased LOC  ? Meds given IV  · Lovenox 40 mg SC daily for DVT prophylaxis.  · DNR confirmed.  Dr. Avery talked with his brother by phone.  Probably would consider palliative care if he does not start to improve enough to where he can eat in the next day or so  · Dispo: TBD  · Prognosis poor.      Nomi Liang MD  [facility]  02/11/21  12:01 EST    Patient was placed in face mask on first look.  I wore protective equipment throughout this patient encounter including a face mask, gloves and  protective eyewear.  Hand hygiene was performed before donning protective equipment and after removal when leaving the room.

## 2021-02-11 NOTE — CONSULTS
"Purpose of the visit was to evaluate for: support for patient/family, transfer to comfort care bed/unit and comfort care. Spoke with MD and RN as well as family and discussed palliative care, goals of care, care options and resuscitation status.      Assessment:  Patient is palliative care appropriate for inpatient care given (list diagnosis/symptoms):  History of dementia, atrial fibrillation, hypertension, and immobility. Patient admitted with COVID 19 pneumonia from nursing home. Per records patient had decreased intake prior to admission. Patient has been restless and required restraints for safety during admission.       Recommendations/Plan: Transfer to palliative care unit for comfort measures, all treatment preferences geared toward symptom managment. Palliative care team will continue to follow for further needs and support.     Other Comments: Spoke with patient's brother, Jesus Alberto, regarding goals of care. Jesus Alberto voiced understanding of patient's condition, stating \"I've been waiting for this day to come, he's been bad for a while now\". Explained palliative care unit, symptom management, and current visitation policy. Brother stated he is 88 years old and their only other family member left is their sister who lives in [facility]. Brother stated goal at this time would be comfort and quality. He voiced appreciation of call and care that has been given so far.   "
no

## 2021-02-12 PROBLEM — Z51.5 ENCOUNTER FOR PALLIATIVE CARE: Status: ACTIVE | Noted: 2021-01-01

## 2021-02-12 NOTE — PROGRESS NOTES
Discharge Planning Assessment  [facility]     Patient Name: Scott BLACK  MRN: [id number]  Today's Date: 2/12/2021    Admit Date: 2/8/2021    Discharge Needs Assessment    No documentation.       Discharge Plan     Row Name 02/12/21 1534       Plan    Plan Comments  The patient transferred to [facility] from [address] [place] [address] on 2/11/21. The patient is palliative. No [facility] order at this time. CCP will follow for any needs that may arise. SHANNAN Haro RN, CCP.        Continued Care and Services - Admitted Since 2/8/2021     Destination Coordination complete    Service Provider Request Status Selected Services Address Phone Fax Patient Preferred    [facility]   Selected Skilled Nursing [address] [place] [id number] [phone] --                Demographic Summary    No documentation.       Functional Status    No documentation.       Psychosocial    No documentation.       Abuse/Neglect    No documentation.       Legal    No documentation.       Substance Abuse    No documentation.       Patient Forms    No documentation.           Sally Haro RN

## 2021-02-12 NOTE — PROGRESS NOTES
Name: Scott BLACK ADMIT: 2021   : 1940  PCP: Provider, No Known    MRN: [id number] LOS: 3 days   AGE/SEX: 80 y.o. male  ROOM: [facility]/     Subjective   Subjective   Pt looking worse today. Poorly responsive. Moans at times. Can't answer any questions.     Review of Systems   Unable to perform ROS: Patient nonverbal        Objective   Objective   Vital Signs  Temp:  [96.9 °F (36.1 °C)-98.3 °F (36.8 °C)] 98.3 °F (36.8 °C)  Heart Rate:  [] 105  Resp:  [12-18] 12  BP: ()/(58-69) 80/58  SpO2:  [94 %-96 %] 94 %  on  Flow (L/min):  [2] 2;   Device (Oxygen Therapy): nasal cannula  Body mass index is 22.64 kg/m².  Physical Exam  Vitals signs and nursing note reviewed.   Constitutional:       General: He is not in acute distress.     Appearance: He is normal weight. He is ill-appearing. He is not toxic-appearing or diaphoretic.      Comments: Somnolent, awakens to voice   HENT:      Head: Normocephalic and atraumatic.      Right Ear: External ear normal.      Left Ear: External ear normal.      Nose: Nose normal.      Mouth/Throat:      Mouth: Mucous membranes are dry.      Pharynx: Oropharynx is clear. No oropharyngeal exudate.   Eyes:      General: No scleral icterus.        Right eye: No discharge.         Left eye: No discharge.      Extraocular Movements: Extraocular movements intact.      Conjunctiva/sclera: Conjunctivae normal.   Neck:      Musculoskeletal: No neck rigidity.   Cardiovascular:      Rate and Rhythm: Normal rate and regular rhythm.      Heart sounds: No murmur.   Pulmonary:      Effort: Pulmonary effort is normal. No respiratory distress.      Breath sounds: No stridor. No wheezing.      Comments: Coarse upper airway noise  Chest:      Chest wall: No tenderness.   Abdominal:      General: Abdomen is flat. Bowel sounds are normal. There is no distension.      Palpations: Abdomen is soft.      Tenderness: There is no abdominal tenderness.   Musculoskeletal:         General: No  swelling or deformity.   Lymphadenopathy:      Cervical: No cervical adenopathy.   Skin:     General: Skin is warm and dry.      Capillary Refill: Capillary refill takes less than 2 seconds.   Neurological:      Mental Status: He is disoriented.      Comments: Somnolent, moves all 4 equally   Psychiatric:      Comments: Unable to assess         Results Review     I reviewed the patient's new clinical results.  Results from last 7 days   Lab Units 02/11/21  0742 02/10/21  1038 02/08/21  2044   WBC 10*3/mm3 4.09 2.77* 2.94*   HEMOGLOBIN g/dL 13.7 14.5 15.1   PLATELETS 10*3/mm3 156 155 154     Results from last 7 days   Lab Units 02/11/21  0742 02/10/21  1038 02/09/21  1007 02/08/21  2044   SODIUM mmol/L 142 150* 156* 152*   POTASSIUM mmol/L 3.9 3.8 4.3 4.3   CHLORIDE mmol/L 111* 117* 120* 116*   CO2 mmol/L 21.2* 23.0 22.2 21.7*   BUN mg/dL 30* 44* 63* 74*   CREATININE mg/dL 1.10 1.21 1.59* 1.80*   GLUCOSE mg/dL 112* 99 89 82   Estimated Creatinine Clearance: 55.5 mL/min (by C-G formula based on SCr of 1.1 mg/dL).  Results from last 7 days   Lab Units 02/11/21  0742 02/10/21  1038 02/08/21  2044   ALBUMIN g/dL 2.80* 3.20* 3.40*   BILIRUBIN mg/dL 0.7 0.6 0.6   ALK PHOS U/L 127* 135* 129*   AST (SGOT) U/L 90* 156* 390*   ALT (SGPT) U/L 150* 215* 373*     Results from last 7 days   Lab Units 02/11/21  0742 02/10/21  1038 02/09/21  1007 02/08/21  2044   CALCIUM mg/dL 8.3* 8.7 8.6 9.1   ALBUMIN g/dL 2.80* 3.20*  --  3.40*     Results from last 7 days   Lab Units 02/09/21 1007 02/08/21  2044   PROCALCITONIN ng/mL 0.27* 0.35*   LACTATE mmol/L  --  1.4     COVID19   Date Value Ref Range Status   02/08/2021 Detected (C) Not Detected - Ref. Range Final     No results found for: HGBA1C, POCGLU    XR Chest 1 View  Narrative: XR CHEST 1 VW-     HISTORY: Male who is 80 years-old,  altered mental status     TECHNIQUE: Frontal view of the chest     COMPARISON: None available     FINDINGS: The heart size is borderline. Infiltrative  opacities are  apparent at the right midlung, right base, with atelectasis or  infiltrate at the left base. Appearance may represent multifocal  pneumonia, possibility of underlying lesion is not excluded, follow-up  recommended, CT could be considered for further evaluation. No pleural  effusion, or pneumothorax. No acute osseous process.     Impression: Multifocal pulmonary opacities may represent pneumonia,  possibility of underlying lesions not excluded, follow-up/further  evaluation recommended as indicated. Borderline heart size.     This report was finalized on 2/8/2021 7:09 PM by Dr. Jake Hearn M.D.       Scheduled Medications  carBAMazepine, 400 mg, Oral, TID  docusate sodium, 100 mg, Oral, BID  traZODone, 50 mg, Oral, Nightly    Infusions   Diet  NPO Diet       Assessment/Plan     Active Hospital Problems    Diagnosis  POA   • **Pneumonia of right middle lobe due to infectious organism [J18.9]  Yes   • Encounter for palliative care [Z51.5]  Not Applicable   • COVID-19 virus detected [U07.1]  Yes   • Hypernatremia [E87.0]  Yes   • ANIVAL (acute kidney injury) (CMS/HCC) [N17.9]  Yes   • Elevated LFTs [R79.89]  Yes   • Atrial fibrillation (CMS/HCC) [I48.91]  Yes   • Essential hypertension [I10]  Yes   • Dementia (CMS/HCC) [F03.90]  Yes   • BPH (benign prostatic hyperplasia) [N40.0]  Yes   • GERD (gastroesophageal reflux disease) [K21.9]  Yes   • Metabolic encephalopathy [G93.41]  Yes      Resolved Hospital Problems   No resolved problems to display.       80 y.o. male admitted with pneumonia of right middle lobe due to infectious organism, COVID-19, and severe dehydration causing hypernatremia and renal failure.     · Pneumonia-likely viral.  He had already received doxycycline prior to admission and had been on Rocephin on arrival here.  Procalcitonin marginally elevated but Rocephin stopped 2/10 as bacterial PNA felt to be unlikely. Blood cultures NGTD. Afebrile.  ? Treated with dexamethasone.  Not  started on remdesivir due to the renal and liver issues  ? Monitored inflammatory markers  · Hypernatremia--resolved.   · ANIVAL--resolved  · Transaminitis-very high on admit.  LFTs fell steadily here. TBili wnl.  Likely secondary to COViD.  ? Dementia with history of aggressive/agitated behaviors-continue restraints as needed to prevent him from pulling off oxygen and IV lines. LOC decreased again this AM. No hypercapnia on ABG.  ? Passed swallow eval, but currently not taking po due to decreased LOC  · Was on Lovenox 40 mg SC daily for DVT prophylaxis.  · DNR confirmed. Palliative Cared RN (Bert) was able to speak with pt's brother Jesus Alberto yesterday and decision was made to change goals of care to comfort only. Pt transferred to [facility] on  with full palliative order set in place. Comfortable at present. Requiring IV morphine and Ativan.  · Dispo: continue palliative care here. Expect that pt will  in our care on [facility].      Nomi Liang MD  [facility]  21  09:09 EST    Patient was placed in face mask on first look.  I wore protective equipment throughout this patient encounter including a face mask, gloves and protective eyewear.  Hand hygiene was performed before donning protective equipment and after removal when leaving the room.

## 2021-02-13 NOTE — PLAN OF CARE
Problem: Adult Inpatient Plan of Care  Goal: Plan of Care Review  Outcome: Ongoing, Progressing  Flowsheets (Taken 2/12/2021 1811)  Progress: declining  Plan of Care Reviewed With: patient  Outcome Summary: Patient medicated with morphine and ativan prior to turns for symptom management with dosages titrated for better control. He is voiding well with incontinence brief. Will continue to monitor per palliative goals of care.

## 2021-02-13 NOTE — PROGRESS NOTES
Name: Scott BLACK ADMIT: 2021   : 1940  PCP: Provider, No Known    MRN: [id number] LOS: 4 days   AGE/SEX: 80 y.o. male  ROOM: [facility]     Subjective   Subjective   Pt unresponsive today. RN reports he has remained comfortable overnight with the use of PRN meds. Certainly looks more comfortable than yesterday when he was still moaning and fidgeting a bit.    Review of Systems   Unable to perform ROS: Patient unresponsive        Objective   Objective   Vital Signs  Temp:  [99.6 °F (37.6 °C)-101.7 °F (38.7 °C)] 99.6 °F (37.6 °C)  Heart Rate:  [106-113] 113  Resp:  [16] 16  BP: (86)/(50) 86/50  SpO2:  [84 %-92 %] 84 %  on   ;   Device (Oxygen Therapy): room air  Body mass index is 22.64 kg/m².  Physical Exam  Vitals signs and nursing note reviewed.   Constitutional:       General: He is not in acute distress.     Appearance: He is normal weight. He is ill-appearing. He is not toxic-appearing or diaphoretic.      Comments: Unresponsive, mouth agape   HENT:      Head: Normocephalic and atraumatic.      Right Ear: External ear normal.      Left Ear: External ear normal.      Nose: Nose normal.      Mouth/Throat:      Mouth: Mucous membranes are dry.      Pharynx: Oropharynx is clear. No oropharyngeal exudate.   Eyes:      General:         Right eye: No discharge.         Left eye: No discharge.      Extraocular Movements: Extraocular movements intact.      Conjunctiva/sclera: Conjunctivae normal.   Neck:      Musculoskeletal: No neck rigidity.   Cardiovascular:      Rate and Rhythm: Regular rhythm. Tachycardia present.      Pulses: Normal pulses.      Heart sounds: No murmur.   Pulmonary:      Effort: Pulmonary effort is normal. No respiratory distress.      Breath sounds: Normal breath sounds. No stridor.   Abdominal:      General: Abdomen is flat. Bowel sounds are normal. There is no distension.      Palpations: Abdomen is soft.   Musculoskeletal:         General: No swelling or deformity.    Lymphadenopathy:      Cervical: No cervical adenopathy.   Skin:     General: Skin is warm and dry.      Capillary Refill: Capillary refill takes 2 to 3 seconds.   Neurological:      Comments: unresponsive   Psychiatric:      Comments: Unable to assess         Results Review     I reviewed the patient's new clinical results.  Results from last 7 days   Lab Units 02/11/21  0742 02/10/21  1038 02/08/21  2044   WBC 10*3/mm3 4.09 2.77* 2.94*   HEMOGLOBIN g/dL 13.7 14.5 15.1   PLATELETS 10*3/mm3 156 155 154     Results from last 7 days   Lab Units 02/11/21  0742 02/10/21  1038 02/09/21  1007 02/08/21  2044   SODIUM mmol/L 142 150* 156* 152*   POTASSIUM mmol/L 3.9 3.8 4.3 4.3   CHLORIDE mmol/L 111* 117* 120* 116*   CO2 mmol/L 21.2* 23.0 22.2 21.7*   BUN mg/dL 30* 44* 63* 74*   CREATININE mg/dL 1.10 1.21 1.59* 1.80*   GLUCOSE mg/dL 112* 99 89 82   Estimated Creatinine Clearance: 55.5 mL/min (by C-G formula based on SCr of 1.1 mg/dL).  Results from last 7 days   Lab Units 02/11/21  0742 02/10/21  1038 02/08/21  2044   ALBUMIN g/dL 2.80* 3.20* 3.40*   BILIRUBIN mg/dL 0.7 0.6 0.6   ALK PHOS U/L 127* 135* 129*   AST (SGOT) U/L 90* 156* 390*   ALT (SGPT) U/L 150* 215* 373*     Results from last 7 days   Lab Units 02/11/21  0742 02/10/21  1038 02/09/21  1007 02/08/21  2044   CALCIUM mg/dL 8.3* 8.7 8.6 9.1   ALBUMIN g/dL 2.80* 3.20*  --  3.40*     Results from last 7 days   Lab Units 02/09/21 1007 02/08/21  2044   PROCALCITONIN ng/mL 0.27* 0.35*   LACTATE mmol/L  --  1.4     COVID19   Date Value Ref Range Status   02/08/2021 Detected (C) Not Detected - Ref. Range Final     No results found for: HGBA1C, POCGLU    XR Chest 1 View  Narrative: XR CHEST 1 VW-     HISTORY: Male who is 80 years-old,  altered mental status     TECHNIQUE: Frontal view of the chest     COMPARISON: None available     FINDINGS: The heart size is borderline. Infiltrative opacities are  apparent at the right midlung, right base, with atelectasis  or  infiltrate at the left base. Appearance may represent multifocal  pneumonia, possibility of underlying lesion is not excluded, follow-up  recommended, CT could be considered for further evaluation. No pleural  effusion, or pneumothorax. No acute osseous process.     Impression: Multifocal pulmonary opacities may represent pneumonia,  possibility of underlying lesions not excluded, follow-up/further  evaluation recommended as indicated. Borderline heart size.     This report was finalized on 2/8/2021 7:09 PM by Dr. Jake Hearn M.D.       Scheduled Medications  carBAMazepine, 400 mg, Oral, TID  docusate sodium, 100 mg, Oral, BID  traZODone, 50 mg, Oral, Nightly    Infusions   Diet  NPO Diet       Assessment/Plan     Active Hospital Problems    Diagnosis  POA   • **Pneumonia of right middle lobe due to infectious organism [J18.9]  Yes   • Encounter for palliative care [Z51.5]  Not Applicable   • COVID-19 virus detected [U07.1]  Yes   • Hypernatremia [E87.0]  Yes   • ANIVAL (acute kidney injury) (CMS/HCC) [N17.9]  Yes   • Elevated LFTs [R79.89]  Yes   • Atrial fibrillation (CMS/HCC) [I48.91]  Yes   • Essential hypertension [I10]  Yes   • Dementia (CMS/HCC) [F03.90]  Yes   • BPH (benign prostatic hyperplasia) [N40.0]  Yes   • GERD (gastroesophageal reflux disease) [K21.9]  Yes   • Metabolic encephalopathy [G93.41]  Yes      Resolved Hospital Problems   No resolved problems to display.       80 y.o. male admitted with pneumonia of right middle lobe due to infectious organism, COVID-19, and severe dehydration causing hypernatremia and renal failure.     · Pneumonia-likely viral.  He had already received doxycycline prior to admission and had been on Rocephin on arrival here.  Procalcitonin marginally elevated but Rocephin stopped 2/10 as bacterial PNA felt to be unlikely. Blood cultures NGTD. Afebrile.  ? Treated with dexamethasone.  Not started on remdesivir due to the renal and liver  issues  ? Monitored inflammatory markers  · Hypernatremia--resolved.   · ANIVAL--resolved  · Transaminitis-very high on admit.  LFTs fell steadily here. TBili wnl.  Likely secondary to COViD.  · Dementia with history of aggressive/agitated behaviors-continue restraints as needed to prevent him from pulling off oxygen and IV lines. LOC decreased again this AM. No hypercapnia on ABG.  · Passed swallow eval, but currently not taking po due to decreased LOC  · Was on Lovenox 40 mg SC daily for DVT prophylaxis.  · DNR confirmed. Palliative Care RN (Bert) was able to speak with pt's brother Jesus Alberto on  and decision was made to change goals of care to comfort only. Pt transferred to [facility] on  with full palliative order set in place. Comfortable at present. Requiring IV morphine, IV Ativan, and IV Robinul.  · D/w RN on [facility]  · Dispo: continue palliative care here. Expect that pt will  in our care on [facility]--maybe later today.     Nomi Liang MD  [facility]  21  09:29 EST    Patient was placed in face mask on first look.  I wore protective equipment throughout this patient encounter including a face mask, gloves and protective eyewear.  Hand hygiene was performed before donning protective equipment and after removal when leaving the room.

## 2021-02-13 NOTE — PLAN OF CARE
Goal Outcome Evaluation:  Plan of Care Reviewed With: patient  Progress: declining  Outcome Summary: Appears comfortable at rest. Medicated q4 prior to turns. Added scope patch and robinul for secretions. Will continue palliative measures

## 2021-02-13 NOTE — PLAN OF CARE
Goal Outcome Evaluation:     Progress: declining  Outcome Summary: patient premdicated prior to turns with morhpine 4mg and ativan 1mg and robinul 0.4mg for comfort. turn side to side for secreations. patient is mottling in feet and knees. called patient's brother Jesus Alberto and updated him on patient's status. will continue to monitor patient's breathing and for pain

## 2021-02-14 NOTE — PLAN OF CARE
Goal Outcome Evaluation:     Progress: declining  Outcome Summary: pt premedicated prior to turns with 1 ativan, 4 morphine, and 0.4 robinul. Pt is minimally responsive. Mottling noted to all extremities. Incontinent brief changed x1. Continue Covid isolation. Will monitor.

## 2021-02-14 NOTE — PLAN OF CARE
Goal Outcome Evaluation:     Progress: declining  Outcome Summary: patient premedicated with morphine 4MG Ativan 1MG and Robinul 0.4MG prior to turns to maintaing comfort. suction was set up for secreations but only had to use x1. new IV per need. side to side turns. will continue to monitor patient for pain and treat with comfort measures.

## 2021-02-14 NOTE — PROGRESS NOTES
Name: Scott BLACK ADMIT: 2021   : 1940  PCP: Provider, No Known    MRN: [id number] LOS: 5 days   AGE/SEX: 80 y.o. male  ROOM: [facility]     Subjective   Subjective   Pt unresponsive again today. RN reports he has remained comfortable overnight with the use of PRN meds.     Review of Systems   Unable to perform ROS: Patient unresponsive        Objective   Objective   Vital Signs  Temp:  [100.9 °F (38.3 °C)-102 °F (38.9 °C)] 102 °F (38.9 °C)  Heart Rate:  [] 92  Resp:  [12-16] 16  BP: (80-84)/(42-52) 80/52  SpO2:  [87 %-88 %] 87 %  on   ;   Device (Oxygen Therapy): room air  Body mass index is 22.64 kg/m².  Physical Exam  Vitals signs and nursing note reviewed.   Constitutional:       General: He is not in acute distress.     Appearance: He is ill-appearing. He is not toxic-appearing or diaphoretic.      Comments: Unresponsive, mouth agape   HENT:      Head: Normocephalic and atraumatic.      Right Ear: External ear normal.      Left Ear: External ear normal.      Nose: Nose normal.      Mouth/Throat:      Mouth: Mucous membranes are dry.   Eyes:      General:         Right eye: No discharge.         Left eye: No discharge.   Cardiovascular:      Rate and Rhythm: Regular rhythm. Tachycardia present.   Pulmonary:      Breath sounds: Normal breath sounds. No stridor.      Comments: Agonal breathing  Abdominal:      General: Abdomen is flat. There is no distension.      Palpations: Abdomen is soft.   Musculoskeletal:         General: No swelling or deformity.   Lymphadenopathy:      Cervical: No cervical adenopathy.   Skin:     Capillary Refill: Capillary refill takes more than 3 seconds.      Comments: Mottling in extremities today   Neurological:      Comments: unresponsive   Psychiatric:      Comments: Unable to assess         Results Review     I reviewed the patient's new clinical results.  Results from last 7 days   Lab Units 21  0742 02/10/21  1038 21  2044   WBC 10*3/mm3 4.09  2.77* 2.94*   HEMOGLOBIN g/dL 13.7 14.5 15.1   PLATELETS 10*3/mm3 156 155 154     Results from last 7 days   Lab Units 02/11/21  0742 02/10/21  1038 02/09/21  1007 02/08/21  2044   SODIUM mmol/L 142 150* 156* 152*   POTASSIUM mmol/L 3.9 3.8 4.3 4.3   CHLORIDE mmol/L 111* 117* 120* 116*   CO2 mmol/L 21.2* 23.0 22.2 21.7*   BUN mg/dL 30* 44* 63* 74*   CREATININE mg/dL 1.10 1.21 1.59* 1.80*   GLUCOSE mg/dL 112* 99 89 82   Estimated Creatinine Clearance: 55.5 mL/min (by C-G formula based on SCr of 1.1 mg/dL).  Results from last 7 days   Lab Units 02/11/21  0742 02/10/21  1038 02/08/21  2044   ALBUMIN g/dL 2.80* 3.20* 3.40*   BILIRUBIN mg/dL 0.7 0.6 0.6   ALK PHOS U/L 127* 135* 129*   AST (SGOT) U/L 90* 156* 390*   ALT (SGPT) U/L 150* 215* 373*     Results from last 7 days   Lab Units 02/11/21  0742 02/10/21  1038 02/09/21  1007 02/08/21  2044   CALCIUM mg/dL 8.3* 8.7 8.6 9.1   ALBUMIN g/dL 2.80* 3.20*  --  3.40*     Results from last 7 days   Lab Units 02/09/21 1007 02/08/21  2044   PROCALCITONIN ng/mL 0.27* 0.35*   LACTATE mmol/L  --  1.4     COVID19   Date Value Ref Range Status   02/08/2021 Detected (C) Not Detected - Ref. Range Final     No results found for: HGBA1C, POCGLU    XR Chest 1 View  Narrative: XR CHEST 1 VW-     HISTORY: Male who is 80 years-old,  altered mental status     TECHNIQUE: Frontal view of the chest     COMPARISON: None available     FINDINGS: The heart size is borderline. Infiltrative opacities are  apparent at the right midlung, right base, with atelectasis or  infiltrate at the left base. Appearance may represent multifocal  pneumonia, possibility of underlying lesion is not excluded, follow-up  recommended, CT could be considered for further evaluation. No pleural  effusion, or pneumothorax. No acute osseous process.     Impression: Multifocal pulmonary opacities may represent pneumonia,  possibility of underlying lesions not excluded, follow-up/further  evaluation recommended as indicated.  Borderline heart size.     This report was finalized on 2/8/2021 7:09 PM by Dr. Jake Hearn M.D.       Scheduled Medications   Infusions   Diet  NPO Diet       Assessment/Plan     Active Hospital Problems    Diagnosis  POA   • **Pneumonia of right middle lobe due to infectious organism [J18.9]  Yes   • Encounter for palliative care [Z51.5]  Not Applicable   • COVID-19 virus detected [U07.1]  Yes   • Hypernatremia [E87.0]  Yes   • ANIVAL (acute kidney injury) (CMS/HCC) [N17.9]  Yes   • Elevated LFTs [R79.89]  Yes   • Atrial fibrillation (CMS/HCC) [I48.91]  Yes   • Essential hypertension [I10]  Yes   • Dementia (CMS/HCC) [F03.90]  Yes   • BPH (benign prostatic hyperplasia) [N40.0]  Yes   • GERD (gastroesophageal reflux disease) [K21.9]  Yes   • Metabolic encephalopathy [G93.41]  Yes      Resolved Hospital Problems   No resolved problems to display.       80 y.o. male admitted with pneumonia of right middle lobe due to infectious organism, COVID-19, and severe dehydration causing hypernatremia and renal failure.     · Pneumonia-likely viral.  He had already received doxycycline prior to admission and had been on Rocephin on arrival here.  Procalcitonin marginally elevated but Rocephin stopped 2/10 as bacterial PNA felt to be unlikely. Blood cultures NGTD. Afebrile.  ? Treated with dexamethasone.  Not started on remdesivir due to the renal and liver issues  ? Monitored inflammatory markers  · Hypernatremia--resolved.   · ANIVAL--resolved  · Transaminitis-very high on admit.  LFTs fell steadily here. TBili wnl.  Likely secondary to COViD.  · Dementia with history of aggressive/agitated behaviors-utilized restraints here as needed to prevent him from pulling off oxygen and IV lines.   · Passed swallow eval, but currently not taking po due to decreased LOC  · Was on Lovenox 40 mg SC daily for DVT prophylaxis.  · DNR confirmed. Palliative Care RN (Bert) was able to speak with pt's brother Jesus Alberto on 2/11 and decision  was made to change goals of care to comfort only. Pt transferred to [facility] on  with full palliative order set in place. Comfortable at present. Requiring IV morphine, IV Ativan, and IV Robinul.  · D/w RN on [facility]  · Dispo: continue palliative care here. Expect that pt will  in our care on [facility]--suspect later today (appears to be actively dying).     20min total time    Nomi Liang MD  [facility]  21  09:10 EST    Patient was placed in face mask on first look.  I wore protective equipment throughout this patient encounter including a face mask, gloves and protective eyewear.  Hand hygiene was performed before donning protective equipment and after removal when leaving the room.

## 2021-02-15 NOTE — DISCHARGE SUMMARY
Patient Name: Scott BLACK  : 1940  MRN: [id number]    Date of Admission: 2021  Date of Death:  2/15/2021 at 0625  Primary Care Physician: Provider, No Known      Chief Complaint:   Not Eating      Discharge Diagnoses     Active Hospital Problems    Diagnosis  POA   • **Pneumonia of right middle lobe due to infectious organism [J18.9]  Yes   • Encounter for palliative care [Z51.5]  Not Applicable   • COVID-19 virus detected [U07.1]  Yes   • Hypernatremia [E87.0]  Yes   • ANIVAL (acute kidney injury) (CMS/HCC) [N17.9]  Yes   • Elevated LFTs [R79.89]  Yes   • Atrial fibrillation (CMS/HCC) [I48.91]  Yes   • Essential hypertension [I10]  Yes   • Dementia (CMS/HCC) [F03.90]  Yes   • BPH (benign prostatic hyperplasia) [N40.0]  Yes   • GERD (gastroesophageal reflux disease) [K21.9]  Yes   • Metabolic encephalopathy [G93.41]  Yes      Resolved Hospital Problems   No resolved problems to display.        Hospital Course     80 y.o. male admitted with pneumonia of right middle lobe due to infectious organism, COVID-19, and severe dehydration causing hypernatremia and renal failure.     · Pneumonia due to COViD-19.  He had already received doxycycline prior to admission and had been on Rocephin on arrival here without improvement.  Procalcitonin marginally elevated but Rocephin stopped 2/10 as bacterial PNA felt to be unlikely. Blood cultures NGTD. Remained afebrile.  ? Treated with dexamethasone.  Not started on remdesivir due to the renal and liver issues  ? Monitored inflammatory markers  · Hypernatremia--resolved after IVFs   · ANIVAL--resolved after IVFs  · Transaminitis-very high on admit.  LFTs fell steadily here. TBili wnl. Felt to be secondary to COViD.  · Dementia with history of aggressive/agitated behaviors-utilized restraints here as needed to prevent him from pulling off oxygen and pulling out IV lines.   · Passed swallow eval, but not taking po due to decreased LOC  · Was on Lovenox 40 mg SC daily for  DVT prophylaxis while here.  · DNR confirmed with brother on admission. Palliative Care RN (Bert) was able to speak with pt's brother Jesus Alberto on  and decision was made to change goals of care to comfort only. Pt transferred to [facility] on  with full palliative order set in place. Pt was kept comfortable. Required IV morphine, IV Ativan, and IV Robinul.  · Pt  at 0625 the morning of 2/15/21    Day of Discharge     Subjective:  Pt unresponsive when last seen    Review of Systems    Physical Exam:  Temp:  [102.6 °F (39.2 °C)] 102.6 °F (39.2 °C)  Heart Rate:  [0-110] 0  Resp:  [0-16] 0  Body mass index is 22.64 kg/m².  Physical Exam (when last examined on 21)  Vitals signs and nursing note reviewed.   Constitutional:       General: He is not in acute distress.     Appearance: He is ill-appearing. He is not toxic-appearing or diaphoretic.      Comments: Unresponsive, mouth agape   HENT:      Head: Normocephalic and atraumatic.      Right Ear: External ear normal.      Left Ear: External ear normal.      Nose: Nose normal.      Mouth/Throat:      Mouth: Mucous membranes are dry.   Eyes:      General:         Right eye: No discharge.         Left eye: No discharge.   Cardiovascular:      Rate and Rhythm: Regular rhythm. Tachycardia present.   Pulmonary:      Breath sounds: Normal breath sounds. No stridor.      Comments: Agonal breathing  Abdominal:      General: Abdomen is flat. There is no distension.      Palpations: Abdomen is soft.   Musculoskeletal:         General: No swelling or deformity.   Lymphadenopathy:      Cervical: No cervical adenopathy.   Skin:     Capillary Refill: Capillary refill takes more than 3 seconds.      Comments: Mottling in extremities today   Neurological:      Comments: unresponsive   Psychiatric:      Comments: Unable to assess        Consultants     Consult Orders (all) (From admission, onward)     Start     Ordered    21 1120  Inpatient Palliative Care Nurse  Consult  Once     Provider:  (Not yet assigned)    02/11/21 1119    02/08/21 2310  [facility] (on-call MD unless specified) Details  Once     Specialty:  Hospitalist  Provider:  (Not yet assigned)    02/08/21 2309              Procedures     Imaging Results (All)     Procedure Component Value Units Date/Time    CT Head Without Contrast [[id number]] Resulted: 02/08/21 2045     Updated: 02/08/21 2047    XR Chest 1 View [[id number]] Collected: 02/08/21 1903     Updated: 02/08/21 1912    Narrative:      XR CHEST 1 VW-     HISTORY: Male who is 80 years-old,  altered mental status     TECHNIQUE: Frontal view of the chest     COMPARISON: None available     FINDINGS: The heart size is borderline. Infiltrative opacities are  apparent at the right midlung, right base, with atelectasis or  infiltrate at the left base. Appearance may represent multifocal  pneumonia, possibility of underlying lesion is not excluded, follow-up  recommended, CT could be considered for further evaluation. No pleural  effusion, or pneumothorax. No acute osseous process.       Impression:      Multifocal pulmonary opacities may represent pneumonia,  possibility of underlying lesions not excluded, follow-up/further  evaluation recommended as indicated. Borderline heart size.     This report was finalized on 2/8/2021 7:09 PM by Dr. Jake Hearn M.D.             Pertinent Labs     Results from last 7 days   Lab Units 02/11/21  0742 02/10/21  1038 02/08/21  2044   WBC 10*3/mm3 4.09 2.77* 2.94*   HEMOGLOBIN g/dL 13.7 14.5 15.1   PLATELETS 10*3/mm3 156 155 154     Results from last 7 days   Lab Units 02/11/21  0742 02/10/21  1038 02/09/21  1007 02/08/21  2044   SODIUM mmol/L 142 150* 156* 152*   POTASSIUM mmol/L 3.9 3.8 4.3 4.3   CHLORIDE mmol/L 111* 117* 120* 116*   CO2 mmol/L 21.2* 23.0 22.2 21.7*   BUN mg/dL 30* 44* 63* 74*   CREATININE mg/dL 1.10 1.21 1.59* 1.80*   GLUCOSE mg/dL 112* 99 89 82   Estimated Creatinine Clearance: 55.5 mL/min (by C-G formula  based on SCr of 1.1 mg/dL).  Results from last 7 days   Lab Units 2142 02/10/21  1038 214   ALBUMIN g/dL 2.80* 3.20* 3.40*   BILIRUBIN mg/dL 0.7 0.6 0.6   ALK PHOS U/L 127* 135* 129*   AST (SGOT) U/L 90* 156* 390*   ALT (SGPT) U/L 150* 215* 373*     Results from last 7 days   Lab Units 21  0742 02/10/21  1038 21  1007 21  2044   CALCIUM mg/dL 8.3* 8.7 8.6 9.1   ALBUMIN g/dL 2.80* 3.20*  --  3.40*     Results from last 7 days   Lab Units 21  2044   LIPASE U/L 63*     Results from last 7 days   Lab Units 2142 02/10/21  1038 02/09/21  1007 02/08/21  2044   CK TOTAL U/L 275* 253*  --   --    TROPONIN T ng/mL  --   --   --  0.010   D DIMER QUANT MCGFEU/mL  --  1.80* 2.17*  --            Invalid input(s): LDLCALC  Results from last 7 days   Lab Units 21   BLOODCX  No growth at 5 days  No growth at 5 days       Test Results Pending at Discharge       Discharge Details       No Known Allergies      Discharge Disposition:        CODE STATUS:    Code Status and Medical Interventions:   Ordered at: 21 1521     Code Status:    No CPR     Medical Interventions (Level of Support Prior to Arrest):    Comfort Measures       Time Spent on Discharge:  Greater than 30 minutes      Nomi Liang MD  [facility]  02/15/21  07:13 EST

## 2021-02-15 NOTE — PLAN OF CARE
Goal Outcome Evaluation:  Plan of Care Reviewed With: patient  Progress: declining  Outcome Summary: Premedicated q4 for turns. Breathing labored at times. Copious secretions. Actively dying. Will continue palliative measures

## 2021-02-15 NOTE — PROGRESS NOTES
Case Management Discharge Note      Final Note: The patient  on 2/15/20 @ 06:25. SHANNAN Haro RN, CCP.    Provided Post Acute Provider List?: N/A  N/A Provider List Comment: Has been at [facility] and will return    Selected Continued Care - Admitted Since 2021     Destination Coordination complete    Service Provider Selected Services Address Phone Fax Patient Preferred    [facility]  Skilled Nursing [address] [place] [address] [phone] --          Durable Medical Equipment    No services have been selected for the patient.              Dialysis/Infusion    No services have been selected for the patient.              Home Medical Care    No services have been selected for the patient.              Therapy    No services have been selected for the patient.              Community Resources    No services have been selected for the patient.                       Final Discharge Disposition Code: 20 -

## 2021-05-10 NOTE — H&P
History and Physical      Patient Name: Scott Mahmood   Patient ID: [id number]   Sex: Male   YOB: 1940        Visit Date: August 13, 2020    Provider: TAVO Beckham   Location: [facility]   Location Address: [address] [place] [address]  [id number]   Location Phone: [phone]          Chief Complaint  · Unable to urinate      History Of Present Illness  The patient is a 79 year old /White male presents today in office to be seen for urinary retention due to Enlarged prostate.   He has had the current problem for 1 week with no relief of symptoms.      Patient's urine appears infected today we will send for urine culture    Patient was seen in the emergency department approximately 1 week ago for urinary retention.  The patient's residual urine at that point was only 103 mL although the nurse in the emergency department was unable to pass the catheter for an unknown reason that was not documented.           Past Medical History  Disease Name Date Onset Notes   Acquired absence of kidney --  --    Anxiety --  --    Arthritis --  --    Arthritis: Hip 05/19/2014 --    Chronic kidney disease (CKD), stage 3 (moderate) --  --    Cognitive communication deficit --  --    Constipation --  --    Dementia --  --    Difficulty walking --  --    Dysphagia --  --    Edema --  --    Gastro-esophageal reflux disease without esophagitis --  --    Gout --  --    High blood pressure --  --    Insomnia --  --    Lumb / LS disc degeneration 06/30/2014 --    Lumbar disc w/o myelopathy 07/10/2014 --    Major depressive disorder --  --    Metabolic encephalopathy --  --    Muscle weakness --  --    Paroxysmal atrial fibrillation --  --    Pneumonia --  --    Renal Calculus --  --    Tachycardia, Unspecified --  --    Unspecified atrial fibrillation --  --    Unspecified osteoarthritis, unspecified site --  --    Urinary retention --  --    UTI (lower urinary tract infection) --  --     Vitamin Deficiency --  --          Past Surgical History  Procedure Name Date Notes   Cataract surgery --  --    Circumcision 09/10/2014 --    EGD 2016 --    Hydrocelectomy --  --    Nephrectomy, left kidney --  --    Prostate Surgery --  --          Medication List  Name Date Started Instructions   acetaminophen 325 mg oral tablet  take 2 tablets (650 mg) by oral route every 4 hours as needed   allopurinol 100 mg oral tablet  take 1 tablet (100 mg) by oral route once daily   aspirin 81 mg oral tablet,chewable  chew 1 tablet (81 mg) by oral route once daily   bisacodyl 10 mg rectal suppository  insert 1 suppository (10 mg) by rectal route once daily as needed for constipation   docusate sodium 100 mg oral capsule  --    Enema Disposable 19-7 gram/118 mL rectal enema  insert   estradiol 0.5 mg oral tablet  take 1 tablet (0.5 mg) by oral route once daily   famotidine 40 mg oral tablet  take 1 tablet (40 mg) by oral route once daily at bedtime   folic acid 1 mg oral tablet  take 1 tablet (1 mg) by oral route once daily   Hydrocerin (with petrolatum) topical cream  apply to affected area(s)   ipratropium-albuterol 0.5 mg-3 mg(2.5 mg base)/3 mL inhalation solution for nebulization  use in nebulizer as directed As needed for 1 day   memantine 5 mg oral tablet  take 1 tablet (5 mg) by oral route 2 times per day   Milk of Magnesia 400 mg/5 mL oral suspension  take 30 milliliters by oral route once daily as needed, followed by a full glass (8 oz) of liquid   multivitamin oral tablet  --    nystatin 100,000 unit/mL oral suspension  --    Paxil 20 mg oral tablet  take 2 tablets (40 mg) by oral route once daily   tamsulosin 0.4 mg oral capsule,extended release 24hr  take 1 capsule (0.4 mg) by oral route once daily 1/2 hour following the same meal each day   Tegretol 200 mg oral tablet  take 1 tablet (200 mg) by oral route every 6 hours   thiamine HCl (vitamin B1) 100 mg oral tablet  --    trazodone 50 mg oral tablet  take 1  tablet (50 mg) by oral route once daily at bedtime   Vitamin B-12 1,000 mcg oral tablet  --    Vitamin D2 1,250 mcg (50,000 unit) oral capsule  --          Allergy List  Allergen Name Date Reaction Notes   NO KNOWN DRUG ALLERGIES --  --  --          Family Medical History  Disease Name Relative/Age Notes   Cancer, Unspecified Father/   --    Heart Attack (MI) Mother/   --          Social History  Finding Status Start/Stop Quantity Notes   Alcohol Never --/-- --  --    Single --  --/-- --  0 children   Smokeless tobacco Current some day --/-- --  --    Tobacco Former --/-- --  --          Review of Systems  · Constitutional  o Denies  o : fatigue  · Eyes  o Denies  o : discharge from eye  · HENT  o Denies  o : headaches  · Breasts  o Denies  o : lumps  · Cardiovascular  o Denies  o : chest pain, irregular heart beats  · Respiratory  o Denies  o : shortness of breath, wheezing  · Gastrointestinal  o Denies  o : nausea, vomiting, diarrhea  · Genitourinary  o Admits  o : incontinence, urinary retention  o Denies  o : urgency, frequency, dysuria  · Integument  o Denies  o : rash, itching, pigmentation changes  · Neurologic  o Admits  o : altered mental status, muscular weakness  · Musculoskeletal  o Denies  o : joint pain, joint swelling  · Endocrine  o Denies  o : polyuria, polydipsia, galactorrhea  · Psychiatric  o Denies  o : anxiety, depression  · Heme-Lymph  o Denies  o : lightheadedness, easy bleeding  · Allergic-Immunologic  o Denies  o : sinus allergy symptoms, allergic dermatitis      Physical Examination  · Constitutional  o Appearance  o : well-nourished, well developed, alert, in no acute distress  · Head and Face  o Head  o :   § Inspection  § : atraumatic, normocephalic  o Face  o :   § Inspection  § : no facial lesions  · Eyes  o Sclerae  o : sclerae white  · Ears, Nose, Mouth and Throat  o Ears  o :   § External Ears  § : appearance within normal limits, no lesions present  o Nose  o :   § External  Nose  § : appearance normal  · Neck  o Inspection/Palpation  o : normal appearance, trachea midline  · Respiratory  o Respiratory Effort  o : breathing unlabored  o Inspection of Chest  o : normal appearance, no retractions  · Skin and Subcutaneous Tissue  o General Inspection  o : no rashes or lesions present, no lesions present, no areas of discoloration  · Neurologic  o Mental Status Examination  o :   § Orientation  § : grossly oriented to person, place and time  § Speech/Language  § : communication ability within normal limits  o Gait and Station  o : normal gait, able to stand without difficulty  · Psychiatric  o Judgement and Insight  o : judgment and insight intact, judgement for everyday activities and social situations within normal limits, insight intact  o Mood and Affect  o : mood normal, affect appropriate          Results  · In-Office Procedures  o Lab procedure  § Automated dipstick urinalysis with microscopy ([id number])   § Color Ur: Alvina   § Clarity Ur: Cloudy   § Glucose Ur Ql Strip: Negative   § Bilirub Ur Ql Strip: 3+   § Ketones Ur Ql Strip: Negative   § Sp Gr Ur Qn: 1.025   § Hgb Ur Ql Strip: Negative   § pH Ur-LsCnc: 5.0   § Prot Ur Ql Strip: Trace   § Urobilinogen Ur Strip-mCnc: 0.2 E.U./dL   § Nitrite Ur Ql Strip: 3+   § WBC Est Ur Ql Strip: positive   § RBC UrnS Qn HPF: 0   § WBC UrnS Qn HPF: 0   § Bacteria UrnS Qn HPF: tntc   § Crystals UrnS Qn HPF: 0   § Epithelial Cells (non renal): 0 /HPF  § Epithelial Cells (renal): 0       Assessment  · Urinary Retention     788.20/R33.9  · Urinary tract infection     599.0/N39.0      Plan  · Orders  o Urine Culture (Clean Catch) [facility] ([id number]) - 788.20/R33.9, 599.0/N39.0 - 08/13/2020  · Medications  o tamsulosin 0.4 mg oral capsule   SIG: take 2 capsules (0.8 mg) by oral route once daily 1/2 hour following the same meal each day for 30 days   DISP: (60) capsules with 11 refills  Prescribed on 08/13/2020     o ciprofloxacin HCl 500 mg oral tablet   SIG:  take 1 tablet (500 mg) by oral route every 12 hours for 7 days   DISP: (14) tablets with 0 refills  Prescribed on 08/13/2020     o tamsulosin 0.4 mg oral capsule,extended release 24hr   SIG: take 1 capsule (0.4 mg) by oral route once daily 1/2 hour following the same meal each day   DISP: (0) capsule with 0 refills  Discontinued on 08/13/2020     · Instructions  o Patient will be started on ciprofloxacin 500 mg twice daily x7 days. His urine does appear acutely infected today unsure of how long this infection has been occurring. Will culture urine today. Patient tamsulosin will be increased to 0.8 mg daily as he has a known history of BPH with bladder outlet obstruction. Educated nursing staff on the diagnoses and treatments. Importance of increasing fluid intake stressed to nursing staff. Strict I&O's. Order given to [facility] patient up to 3 times daily if needed. Will follow up via telephone in 2 weeks to speak with nursing staff about patient.            Electronically Signed by: TAVO Beckham -Author on August 13, 2020 11:02:06 AM

## 2021-05-10 NOTE — H&P
History and Physical      Patient Name: Scott Mahmood   Patient ID: [id number]   Sex: Male   YOB: 1940        Visit Date: August 31, 2020    Provider: Nicol Vazquez PA-C   Location: [facility]   Location Address: [address] [place] [address]  [id number]   Location Phone: [phone]          Chief Complaint  · Left hip pain      History Of Present Illness  Scott Mahmood is a 80 year old /White male who presents today to [facility].      Patient is status post left ORIF for intertrochanteric femur fracture performed 8/18/20 by Dr. Da Silva. Patient states mild pain. Patient is residing at [facility]. Patient is here in a wheelchair. Patient had x rays reviewed stable ORIF of left intertrochanteric femur fracture.       Past Medical History  Acquired absence of kidney; Anxiety; Arthritis; Arthritis: Hip; Chronic kidney disease (CKD), stage 3 (moderate); Cognitive communication deficit; Constipation; Dementia; Difficulty walking; Dysphagia; Edema; Gastro-esophageal reflux disease without esophagitis; Gout; High blood pressure; Insomnia; Lumb / LS disc degeneration; Lumbar disc w/o myelopathy; Major depressive disorder; Metabolic encephalopathy; Muscle weakness; Paroxysmal atrial fibrillation; Pneumonia; Renal Calculus; Tachycardia, Unspecified; Unspecified atrial fibrillation; Unspecified osteoarthritis, unspecified site; Urinary Retention; UTI (lower urinary tract infection); Vitamin Deficiency         Past Surgical History  Cataract surgery; Circumcision; EGD; Hydrocelectomy; Nephrectomy, left kidney; Prostate Surgery         Medication List  acetaminophen 325 mg oral tablet; allopurinol 100 mg oral tablet; aspirin 81 mg oral tablet,chewable; bisacodyl 10 mg rectal suppository; ciprofloxacin HCl 500 mg oral tablet; docusate sodium 100 mg oral capsule; Enema Disposable 19-7 gram/118 mL rectal enema; estradiol 0.5 mg oral tablet; famotidine 40 mg oral tablet; folic  acid 1 mg oral tablet; Hydrocerin (with petrolatum) topical cream; ipratropium-albuterol 0.5 mg-3 mg(2.5 mg base)/3 mL inhalation solution for nebulization; memantine 5 mg oral tablet; Milk of Magnesia 400 mg/5 mL oral suspension; multivitamin oral tablet; nystatin 100,000 unit/mL oral suspension; Paxil 20 mg oral tablet; tamsulosin 0.4 mg oral capsule; Tegretol 200 mg oral tablet; thiamine HCl (vitamin B1) 100 mg oral tablet; trazodone 50 mg oral tablet; Vitamin B-12 1,000 mcg oral tablet; Vitamin D2 1,250 mcg (50,000 unit) oral capsule         Allergy List  NO KNOWN DRUG ALLERGIES         Family Medical History  Cancer, Unspecified; Heart Attack (MI)         Social History  Alcohol (Never); Single; Smokeless tobacco (Current some day); Tobacco (Former)         Review of Systems  · Constitutional  o Denies  o : fever, chills, weight loss  · Cardiovascular  o Denies  o : chest pain, shortness of breath  · Gastrointestinal  o Denies  o : liver disease, heartburn, nausea, blood in stools  · Genitourinary  o Denies  o : painful urination, blood in urine  · Integument  o Denies  o : rash, itching  · Neurologic  o Denies  o : headache, weakness, loss of consciousness  · Musculoskeletal  o Denies  o : painful, swollen joints  · Psychiatric  o Denies  o : drug/alcohol addiction, anxiety, depression      Vitals  Date Time BP Position Site L\R Cuff Size HR RR TEMP (F) WT  HT  BMI kg/m2 BSA m2 O2 Sat        08/31/2020 10:11 AM         188lbs 0oz 6'   25.5 2.08           Physical Examination  · Constitutional  o Appearance  o : well developed, well-nourished, no obvious deformities present  · Head and Face  o Head  o :   § Inspection  § : normocephalic  o Face  o :   § Inspection  § : no facial lesions  · Eyes  o Conjunctivae  o : conjunctivae normal  o Sclerae  o : sclerae white  · Ears, Nose, Mouth and Throat  o Ears  o :   § External Ears  § : appearance within normal limits  § Hearing  § : intact  o Nose  o :    § External Nose  § : appearance normal  · Neck  o Inspection/Palpation  o : normal appearance  o Range of Motion  o : full range of motion  · Respiratory  o Respiratory Effort  o : breathing unlabored  o Inspection of Chest  o : normal appearance  o Auscultation of Lungs  o : no audible wheezing or rales  · Cardiovascular  o Heart  o : regular rate  · Gastrointestinal  o Abdominal Examination  o : soft and non-tender  · Skin and Subcutaneous Tissue  o General Inspection  o : intact, no rashes  · Psychiatric  o General  o : Alert and oriented x3  o Judgement and Insight  o : judgment and insight intact  o Mood and Affect  o : mood normal, affect appropriate  · Left Hip-Street Exam  o Inspection  o : well-healed scar   o Palpation  o : calf supple non tender, no tenderness at hip and pelvic muscles  o Neurologic Testing  o : Neurovascular and sensation intact          Assessment  · Aftercare following surgery of the muskuloskeletal system     V54.81  · Fracture: Femur     821.20  · Left knee pain, unspecified chronicity     719.46/M25.562      Plan  · Medications  o Medications have been Reconciled  o Transition of Care or Provider Policy  · Instructions  o Staples removed in clinic today.  o Reviewed the patient's Past Medical, Social, and Family history as well as the ROS at today's visit, no changes.  o Call or return if worsening symptoms.  o Follow Up in 4 weeks.   o Electronically Identified Patient Education Materials Provided Electronically     Patient will follow up 4 weeks, x ray  TTWBAT with walker             Electronically Signed by: ALEAH Figueroa-C -Author on August 31, 2020 11:07:04 AM  Electronically Co-signed by: Rogelio Da Silva MD -Reviewer on August 31, 2020 09:30:51 PM

## 2021-05-13 NOTE — PROGRESS NOTES
Progress Note      Patient Name: Scott Mahmood   Patient ID: [id number]   Sex: Male   YOB: 1940        Visit Date: September 28, 2020    Provider: Nicol Vazquez PA-C   Location: [facility]   Location Address: [address] [place] [address]  [id number]   Location Phone: [phone]          Chief Complaint  · Left hip pain      History Of Present Illness  Scott Mahmood is a 80 year old /White male who presents today to [facility].      Patient is status post left ORIF for intertrochanteric femur fracture performed 8/18/20 by Dr. Da Silva. Patient states mild pain. Patient is residing at [facility]. Patient is here in a wheelchair. Patient had x rays reviewed stable ORIF of left intertrochanteric femur fracture.            Past Medical History  Acquired absence of kidney; Anxiety; Arthritis; Arthritis: Hip; Chronic kidney disease (CKD), stage 3 (moderate); Cognitive communication deficit; Constipation; Dementia; Difficulty walking; Dysphagia; Edema; Gastro-esophageal reflux disease without esophagitis; Gout; High blood pressure; Insomnia; Lumb / LS disc degeneration; Lumbar disc w/o myelopathy; Major depressive disorder; Metabolic encephalopathy; Muscle weakness; Paroxysmal atrial fibrillation; Pneumonia; Renal Calculus; Tachycardia, Unspecified; Unspecified atrial fibrillation; Unspecified osteoarthritis, unspecified site; Urinary Retention; UTI (lower urinary tract infection); Vitamin Deficiency         Past Surgical History  Cataract surgery; Circumcision; EGD; Hydrocelectomy; Nephrectomy, left kidney; Prostate Surgery         Medication List  acetaminophen 325 mg oral tablet; allopurinol 100 mg oral tablet; aspirin 81 mg oral tablet,chewable; bisacodyl 10 mg rectal suppository; docusate sodium 100 mg oral capsule; Enema Disposable 19-7 gram/118 mL rectal enema; enoxaparin 40 mg/0.4 mL subcutaneous syringe; estradiol 0.5 mg oral tablet; famotidine 40 mg oral  tablet; folic acid 1 mg oral tablet; Hydrocerin (with petrolatum) topical cream; hydrocodone-acetaminophen 5-325 mg oral tablet; ipratropium-albuterol 0.5 mg-3 mg(2.5 mg base)/3 mL inhalation solution for nebulization; memantine 5 mg oral tablet; metoprolol succinate 25 mg oral tablet extended release 24 hr; Milk of Magnesia 400 mg/5 mL oral suspension; multivitamin oral tablet; nystatin 100,000 unit/mL oral suspension; Paxil 20 mg oral tablet; tamsulosin 0.4 mg oral capsule; Tegretol 200 mg oral tablet; thiamine HCl (vitamin B1) 100 mg oral tablet; trazodone 50 mg oral tablet; Vitamin B-12 1,000 mcg oral tablet; Vitamin D2 1,250 mcg (50,000 unit) oral capsule         Allergy List  NO KNOWN DRUG ALLERGIES         Family Medical History  Cancer, Unspecified; Heart Attack (MI)         Social History  Alcohol (Never); Single; Smokeless tobacco (Current some day); Tobacco (Former)         Review of Systems  · Constitutional  o Denies  o : fever, chills, weight loss  · Cardiovascular  o Denies  o : chest pain, shortness of breath  · Gastrointestinal  o Denies  o : liver disease, heartburn, nausea, blood in stools  · Genitourinary  o Denies  o : painful urination, blood in urine  · Integument  o Denies  o : rash, itching  · Neurologic  o Denies  o : headache, weakness, loss of consciousness  · Musculoskeletal  o Denies  o : painful, swollen joints  · Psychiatric  o Denies  o : drug/alcohol addiction, anxiety, depression      Vitals  Date Time BP Position Site L\R Cuff Size HR RR TEMP (F) WT  HT  BMI kg/m2 BSA m2 O2 Sat        09/28/2020 02:21 PM      87 - R   188lbs 6oz    94 %          Physical Examination  · Constitutional  o Appearance  o : well developed, well-nourished, no obvious deformities present  · Head and Face  o Head  o :   § Inspection  § : normocephalic  o Face  o :   § Inspection  § : no facial lesions  · Eyes  o Conjunctivae  o : conjunctivae normal  o Sclerae  o : sclerae white  · Ears, Nose, Mouth and  Throat  o Ears  o :   § External Ears  § : appearance within normal limits  § Hearing  § : intact  o Nose  o :   § External Nose  § : appearance normal  · Neck  o Inspection/Palpation  o : normal appearance  o Range of Motion  o : full range of motion  · Respiratory  o Respiratory Effort  o : breathing unlabored  o Inspection of Chest  o : normal appearance  o Auscultation of Lungs  o : no audible wheezing or rales  · Cardiovascular  o Heart  o : regular rate  · Gastrointestinal  o Abdominal Examination  o : soft and non-tender  · Skin and Subcutaneous Tissue  o General Inspection  o : intact, no rashes  · Psychiatric  o General  o : Alert and oriented x3  o Judgement and Insight  o : judgment and insight intact  o Mood and Affect  o : mood normal, affect appropriate  · Left Hip-Street Exam  o Inspection  o : well-healed scar   o Palpation  o : tenderness at hip and pelvic muscles  o ROM  o : normal extension (30), normal abduction (45-50), normal adduction, normal internal rotation, normal external rotation  o Special Tests  o : no pain with flexion, no pain with extension, no pain with rotation  o Neurologic Testing  o : Neurovascular and sensation intact          Assessment  · Aftercare following surgery of the muskuloskeletal system     V54.81  · Fracture: Femur     821.20  · Pain: Hip     719.45/M25.559      Plan  · Orders  o Tobacco cessation counseling completed (9914F) - - 09/28/2020  · Medications  o Medications have been Reconciled  o Transition of Care or Provider Policy  · Instructions  o Reviewed the patient's Past Medical, Social, and Family history as well as the ROS at today's visit, no changes.  o Call or return if worsening symptoms.  o Reviewed Xrays.  o Follow Up in 4 weeks.   o Electronically Identified Patient Education Materials Provided Electronically     WBAT with walker  Continue physical therapy at nursing home  Follow up 4 weeks, x ray             Electronically Signed by: Nicol  OSMIN Vazquez -Author on September 28, 2020 02:52:41 PM  Electronically Co-signed by: Rogelio Da Silva MD -Reviewer on September 28, 2020 05:55:40 PM

## 2021-05-13 NOTE — PROGRESS NOTES
Progress Note      Patient Name: Scott Mahmood   Patient ID: [id number]   Sex: Male   YOB: 1940        Visit Date: September 14, 2020    Provider: Gisela Simons MD   Location: [facility]   Location Address: [address] [place] [address]  [id number]   Location Phone: [phone]          Chief Complaint  · pt is here for urological concerns      History Of Present Illness  The patient is a 80 year old /White male presents today in office to be seen for difficulty voiding due to WHAT IS THE REASON.   He has had the current problem for months with moderate relief of symptoms.      Patient was seen in the emergency department approximately 1 month ago for urinary retention.  The patient's residual urine at that point was only 103 mL although the nurse in the emergency department was unable to pass the catheter for an unknown reason that was not documented.    He was seen by Shea VO and was started on Flomax 0.8mg po qday.  He is emptying well and PVR today was only 6cc.  He has no complaints.                Past Medical History  Acquired absence of kidney; Anxiety; Arthritis; Arthritis: Hip; Chronic kidney disease (CKD), stage 3 (moderate); Cognitive communication deficit; Constipation; Dementia; Difficulty walking; Dysphagia; Edema; Gastro-esophageal reflux disease without esophagitis; Gout; High blood pressure; Insomnia; Lumb / LS disc degeneration; Lumbar disc w/o myelopathy; Major depressive disorder; Metabolic encephalopathy; Muscle weakness; Paroxysmal atrial fibrillation; Pneumonia; Renal Calculus; Tachycardia, Unspecified; Unspecified atrial fibrillation; Unspecified osteoarthritis, unspecified site; Urinary Retention; UTI (lower urinary tract infection); Vitamin Deficiency         Past Surgical History  Cataract surgery; Circumcision; EGD; Hydrocelectomy; Nephrectomy, left kidney; Prostate Surgery         Medication List  acetaminophen 325 mg oral  tablet; allopurinol 100 mg oral tablet; aspirin 81 mg oral tablet,chewable; bisacodyl 10 mg rectal suppository; ciprofloxacin HCl 500 mg oral tablet; docusate sodium 100 mg oral capsule; Enema Disposable 19-7 gram/118 mL rectal enema; enoxaparin 40 mg/0.4 mL subcutaneous syringe; estradiol 0.5 mg oral tablet; famotidine 40 mg oral tablet; folic acid 1 mg oral tablet; Hydrocerin (with petrolatum) topical cream; hydrocodone-acetaminophen 5-325 mg oral tablet; ipratropium-albuterol 0.5 mg-3 mg(2.5 mg base)/3 mL inhalation solution for nebulization; memantine 5 mg oral tablet; metoprolol succinate 25 mg oral tablet extended release 24 hr; Milk of Magnesia 400 mg/5 mL oral suspension; multivitamin oral tablet; nystatin 100,000 unit/mL oral suspension; Paxil 20 mg oral tablet; tamsulosin 0.4 mg oral capsule; Tegretol 200 mg oral tablet; thiamine HCl (vitamin B1) 100 mg oral tablet; trazodone 50 mg oral tablet; Vitamin B-12 1,000 mcg oral tablet; Vitamin D2 1,250 mcg (50,000 unit) oral capsule         Allergy List  NO KNOWN DRUG ALLERGIES         Family Medical History  Cancer, Unspecified; Heart Attack (MI)         Social History  Alcohol (Never); Single; Smokeless tobacco (Current some day); Tobacco (Former)         Review of Systems  · Constitutional  o Denies  o : chills, fever  · Gastrointestinal  o Denies  o : nausea, vomiting      Vitals  Date Time BP Position Site L\R Cuff Size HR RR TEMP (F) WT  HT  BMI kg/m2 BSA m2 O2 Sat        09/14/2020 10:16 /62 Sitting       188lbs 0oz 6'   25.5 2.08           Physical Examination  · Constitutional  o Appearance  o : well-nourished, well developed, alert, in no acute distress  · Head and Face  o Head  o :   § Inspection  § : atraumatic, normocephalic  o Face  o :   § Inspection  § : no facial lesions  · Eyes  o Sclerae  o : sclerae white  · Ears, Nose, Mouth and Throat  o Ears  o :   § External Ears  § : appearance within normal limits, no lesions  present  o Nose  o :   § External Nose  § : appearance normal  · Neck  o Inspection/Palpation  o : normal appearance, trachea midline  · Respiratory  o Respiratory Effort  o : breathing unlabored  · Skin and Subcutaneous Tissue  o General Inspection  o : no rashes or lesions present, no lesions present, no areas of discoloration  · Neurologic  o Mental Status Examination  o :   § Orientation  § : grossly oriented to person, place and time  § Speech/Language  § : communication ability within normal limits  o Gait and Station  o : normal gait, able to stand without difficulty  · Psychiatric  o Judgement and Insight  o : judgment and insight intact, judgement for everyday activities and social situations within normal limits, insight intact  o Mood and Affect  o : mood normal, affect appropriate          Results  · In-Office Procedures  o Surgical procedure  § Bladder Scan/Residual Urine (18062)   § Specimen vol Ur: 6cc       Assessment  · Urinary Retention     788.20/R33.9  · Urinary tract infection     599.0/N39.0      Plan  · Medications  o Medications have been Reconciled  o Transition of Care or Provider Policy  · Instructions  o Continue on tamsulosin 0.8 mg daily as he has a known history of BPH with bladder outlet obstruction. Mrs. Estrada had educated nursing staff on the diagnoses and treatments. Importance of increasing fluid intake stressed to nursing staff. Strict I&O's. Order given to [facility] patient up to 3 times daily if needed at his previous visit and I concur. Follow up in one year or sooner if needed.   o Electronically Identified Patient Education Materials Provided Electronically            Electronically Signed by: Gisela Simons MD -Author on September 14, 2020 11:21:12 AM

## 2021-05-13 NOTE — PROGRESS NOTES
Progress Note      Patient Name: Scott Mahmood   Patient ID: [id number]   Sex: Male   YOB: 1940        Visit Date: October 28, 2020    Provider: Nicol Vazquez PA-C   Location: [facility]   Location Address: [address] [place] [address]  [id number]   Location Phone: [phone]          Chief Complaint  · Left hip pain       History Of Present Illness  TELEHEALTH TELEPHONE VISIT  Scott Mahmood is a 80 year old /White male who is presenting for evaluation via telehealth telephone visit. Verbal consent obtained before beginning visit.   Provider spent 5 minutes with the patient during the telehealth visit.   The following staff were present during this visit: INPUT BOX   Past Medical History/ Overview of Patient Symptoms     Patient is status post left ORIF for intertrochanteric femur fracture performed 8/18/20 by Dr. Da Silva. Discussed Patient's care with nurse from [facility]. Nurse states Patient is walking with assistance. Nurse states Patient does not complain of pain.   Recommended x ray in 4 weeks requesting copy of x ray            Review of Systems  · Constitutional  o Denies  o : fever, chills, weight loss  · Cardiovascular  o Denies  o : chest pain, shortness of breath  · Gastrointestinal  o Denies  o : liver disease, heartburn, nausea, blood in stools  · Genitourinary  o Denies  o : painful urination, blood in urine  · Integument  o Denies  o : rash, itching  · Neurologic  o Denies  o : headache, weakness, loss of consciousness  · Musculoskeletal  o Denies  o : painful, swollen joints  · Psychiatric  o Denies  o : drug/alcohol addiction, anxiety, depression      Vitals  Date Time BP Position Site L\R Cuff Size HR RR TEMP (F) WT  HT  BMI kg/m2 BSA m2 O2 Sat FR L/min FiO2 HC       10/28/2020 12:54 PM         188lbs 0oz 6'   25.5 2.08       10/28/2020 12:54 PM         188lbs 0oz 6'   25.5 2.08       10/28/2020 12:54 PM         188lbs 0oz 6'   25.5 2.08        10/28/2020 12:54 PM         188lbs 0oz 6'   25.5 2.08             Physical Examination  · Constitutional  o Appearance  o : well developed, well-nourished, no obvious deformities present  · Head and Face  o Head  o :   § Inspection  § : normocephalic  o Face  o :   § Inspection  § : no facial lesions  · Eyes  o Conjunctivae  o : conjunctivae normal  o Sclerae  o : sclerae white  · Ears, Nose, Mouth and Throat  o Ears  o :   § External Ears  § : appearance within normal limits  § Hearing  § : intact  o Nose  o :   § External Nose  § : appearance normal  · Neck  o Inspection/Palpation  o : normal appearance  o Range of Motion  o : full range of motion  · Respiratory  o Respiratory Effort  o : breathing unlabored  o Inspection of Chest  o : normal appearance  o Auscultation of Lungs  o : no audible wheezing or rales  · Cardiovascular  o Heart  o : regular rate  · Gastrointestinal  o Abdominal Examination  o : soft and non-tender  · Skin and Subcutaneous Tissue  o General Inspection  o : intact, no rashes  · Psychiatric  o General  o : Alert and oriented x3  o Judgement and Insight  o : judgment and insight intact  o Mood and Affect  o : mood normal, affect appropriate          Assessment  · Left Pain: Hip     719.45/M25.559      Plan  · Instructions  o Plan Of Care:   o Reviewed the patient's Past Medical, Social, and Family history as well as the ROS at today's visit, no changes.  o Call or return if worsening symptoms.            Electronically Signed by: Nicol Vazquez PA-C -Author on October 28, 2020 01:00:14 PM

## 2021-05-14 VITALS
WEIGHT: 188 LBS | SYSTOLIC BLOOD PRESSURE: 111 MMHG | HEIGHT: 72 IN | DIASTOLIC BLOOD PRESSURE: 62 MMHG | BODY MASS INDEX: 25.47 KG/M2

## 2021-05-14 VITALS — WEIGHT: 188 LBS | BODY MASS INDEX: 25.47 KG/M2 | HEIGHT: 72 IN

## 2021-05-14 VITALS — HEIGHT: 72 IN | WEIGHT: 188 LBS | BODY MASS INDEX: 25.47 KG/M2

## 2021-05-14 VITALS — WEIGHT: 188.37 LBS | OXYGEN SATURATION: 94 % | HEART RATE: 87 BPM

## 2021-05-17 NOTE — PLAN OF CARE
Goal Outcome Evaluation:     Progress: no change  Outcome Summary: Patient remains in bilateral soft wrist restraints throughout shift. Vital signs stable. Patient does not appear to be in any pain or discomfort. Incontinence care providded and patient repositioned every 2 hours. Will continue to monitor.   anterior rectal mass, measuring approx 3-4 cm, just above dentate line